# Patient Record
Sex: FEMALE | Race: WHITE | NOT HISPANIC OR LATINO | Employment: FULL TIME | URBAN - METROPOLITAN AREA
[De-identification: names, ages, dates, MRNs, and addresses within clinical notes are randomized per-mention and may not be internally consistent; named-entity substitution may affect disease eponyms.]

---

## 2018-09-11 ENCOUNTER — GYNECOLOGY VISIT (OUTPATIENT)
Dept: OBGYN | Facility: MEDICAL CENTER | Age: 33
End: 2018-09-11
Payer: COMMERCIAL

## 2018-09-11 ENCOUNTER — HOSPITAL ENCOUNTER (OUTPATIENT)
Facility: MEDICAL CENTER | Age: 33
End: 2018-09-11
Attending: OBSTETRICS & GYNECOLOGY
Payer: COMMERCIAL

## 2018-09-11 VITALS
WEIGHT: 119 LBS | DIASTOLIC BLOOD PRESSURE: 70 MMHG | BODY MASS INDEX: 20.32 KG/M2 | SYSTOLIC BLOOD PRESSURE: 110 MMHG | HEIGHT: 64 IN

## 2018-09-11 DIAGNOSIS — N93.8 DUB (DYSFUNCTIONAL UTERINE BLEEDING): ICD-10-CM

## 2018-09-11 PROCEDURE — 99203 OFFICE O/P NEW LOW 30 MIN: CPT | Performed by: OBSTETRICS & GYNECOLOGY

## 2018-09-11 PROCEDURE — 87491 CHLMYD TRACH DNA AMP PROBE: CPT

## 2018-09-11 PROCEDURE — 76817 TRANSVAGINAL US OBSTETRIC: CPT | Performed by: OBSTETRICS & GYNECOLOGY

## 2018-09-11 PROCEDURE — 87591 N.GONORRHOEAE DNA AMP PROB: CPT

## 2018-09-11 NOTE — PROGRESS NOTES
"Monalisa Mcknight,  32 y.o.  female with Patient's last menstrual period was 2018. presents today with complaint of dysfunctional uterine bleeding.    Subjective : Patient presents to the office for absent menses.    Nausea/Vomiting: No:    Abdominal /pelvic cramping : No :  Vaginal bleeding:No  Positive home UPT       Pertinent positives documented in HPI and all other systems reviewed & are negative    OB History    Para Term  AB Living   3       2     SAB TAB Ectopic Molar Multiple Live Births     2              # Outcome Date GA Lbr Pratik/2nd Weight Sex Delivery Anes PTL Lv   3             2 TAB            1 TAB                   Past Gyn history:  Current Birth control:  none    History reviewed. No pertinent past medical history.  History reviewed. No pertinent surgical history.  Meds: PNV  Allergies: Patient has no known allergies.    Physical Exam:    /70   Ht 1.626 m (5' 4\")   Wt 54 kg (119 lb)   LMP 2018   BMI 20.43 kg/m²   Gen: well-appearing, well-hydrated, well-nourished, thin  HEENT: normal;   PERRLA, EOMI, fundi grossly normal, no papilledema, no AV nicking, sclera clear  Lungs: Clear to auscultation  Heart: RRR No M  Abd: abdomen is soft without significant tenderness, masses, organomegaly or guarding  Pelvic: External genitalia normal, Vagina normal without discharge, Urethra without abnormality or discharge, cervix normal in appearance, no CMT, no bladder tenderness, no adnexal masses or tendernessLab.  Ext: NT bilaterally, no cyanosis, clubbing or edema    No results found for this or any previous visit (from the past 336 hour(s)).    Ultrasound:     TVUS performed and per my read:    Indication: Dating    Findings: cortez intrauterine pregnancy @ 5w5d by CRL.   Positive gestational sac.  Positive yolk sac.   Positive fetal cardiac activity.   No masses seen   No free fluid in the cul-de-sac.    Impression: viable IUP @ 5w5d. EDC by US of " 19      Assessment:  32 y.o.  at 5w5d  amenorrhea  Pregnancy exam/test positive    Plan:  4 weeks  Pap smear UTD, repeat after delivery  Normal pregnancy symptoms discussed  SAB/labor precautions educated  Order prenatal labs and any additional imaging needed at new OB visit  Drink at least 2 liters of water daily  Exercise 30 minutes daily  Call MD rico/ questions or concerns  GC/CT today

## 2018-09-12 DIAGNOSIS — N93.8 DUB (DYSFUNCTIONAL UTERINE BLEEDING): ICD-10-CM

## 2018-09-12 LAB
C TRACH DNA SPEC QL NAA+PROBE: NEGATIVE
N GONORRHOEA DNA SPEC QL NAA+PROBE: NEGATIVE
SPECIMEN SOURCE: NORMAL

## 2018-09-20 ENCOUNTER — TELEPHONE (OUTPATIENT)
Dept: OBGYN | Facility: MEDICAL CENTER | Age: 33
End: 2018-09-20

## 2018-09-20 NOTE — TELEPHONE ENCOUNTER
Called patient, no answer. Left voice asking patient to call us back to let her know her results to GC/CT.     -Negative results.

## 2018-10-17 ENCOUNTER — INITIAL PRENATAL (OUTPATIENT)
Dept: OBGYN | Facility: MEDICAL CENTER | Age: 33
End: 2018-10-17
Payer: COMMERCIAL

## 2018-10-17 VITALS — WEIGHT: 121 LBS | BODY MASS INDEX: 20.77 KG/M2 | DIASTOLIC BLOOD PRESSURE: 62 MMHG | SYSTOLIC BLOOD PRESSURE: 110 MMHG

## 2018-10-17 DIAGNOSIS — Z3A.10 10 WEEKS GESTATION OF PREGNANCY: ICD-10-CM

## 2018-10-17 PROCEDURE — 59401 PR NEW OB VISIT: CPT | Performed by: OBSTETRICS & GYNECOLOGY

## 2018-10-17 NOTE — PROGRESS NOTES
Cc: New OB visit    HPI:  The patient is a 32 y.o.  10w6d based upon early US  Patient's last menstrual period was 2018.    She presents for her new obstetric visit.    She denies fetal movement, denies vaginal bleeding, denies leakage of fluid, denies contractions.     She denies nausea/vomiting, headaches, or urinary symptoms.      OB History    Para Term  AB Living   4       2     SAB TAB Ectopic Molar Multiple Live Births     2              # Outcome Date GA Lbr Pratik/2nd Weight Sex Delivery Anes PTL Lv   4 Current            3             2 TAB            1 TAB                 No past medical history on file.  No past surgical history on file.  Social History     Social History   • Marital status: Single     Spouse name: N/A   • Number of children: N/A   • Years of education: N/A     Occupational History   • Not on file.     Social History Main Topics   • Smoking status: Never Smoker   • Smokeless tobacco: Never Used   • Alcohol use No   • Drug use: No   • Sexual activity: Yes     Partners: Male     Other Topics Concern   • Not on file     Social History Narrative   • No narrative on file     No family history on file.  Allergies:   Allergies as of 10/17/2018   • (No Known Allergies)       PE:    Blood pressure 110/62, weight 54.9 kg (121 lb), last menstrual period 2018.    General: no apparent distress, is well developed and well nourished  Head: normocephalic, atraumatic  Neck: neck is supple, non-tender, no thyromegaly, full range of motion  CVS: regular rate and rhythm, no peripheral edema  Lungs: Normal respiratory effort. Clear to auscultation bilaterally  Abdomen: Bowel sounds positive, nondistended, soft, nontender x4, no rebound or guarding.  Female GYN: normal female external genitalia without lesionsnormal external genitalia, no erythema, no discharge  Skin: No rashes, or ulcers or lesions seen  Psychiatric: Patient shows appropriate affect, is alert and oriented  x3, intact judgment and insight.        A/P:  32 y.o.  10w6d based upon  Patient's last menstrual period was 2018..  She is here for her new obstetric visit.    1. 10 weeks gestation of pregnancy  PRENATAL PANEL 3+HIV+UACXI    URINE CULTURE(NEW)    CYSTIC FIBROSIS PROFILE    SPINAL MUSCULAR ATROPHY COPY NUMBER ANALYSIS    CANCELED: SPINAL MUSCULAR ATROPHY COPY NUMBER ANALYSIS    CANCELED: SPINAL MUSCULAR ATROPHY COPY NUMBER ANALYSIS    CANCELED: SPINAL MUSCULAR ATROPHY DELETION       1. Ultrasound for dates and anatomy at 20 weeks.  2. Second trimester screening for Down Syndrome and neural tube defects offered.  Patient will obtain between 15-20  3.  ordered prenatal labs.  4.  NOB packet given with ACOG prenatal book.  5.  Increase water intake and encouraged healthy nutrition.  6.  Referral to MFM not needed at this time.  7.  Continue prenatal vitamins.  8.  Follow-up in 4-5 weeks.   9.  SAB precautions educated.  10.  SMA and CF screening done

## 2018-10-23 ENCOUNTER — HOSPITAL ENCOUNTER (OUTPATIENT)
Dept: LAB | Facility: MEDICAL CENTER | Age: 33
End: 2018-10-23
Attending: OBSTETRICS & GYNECOLOGY
Payer: COMMERCIAL

## 2018-10-23 DIAGNOSIS — Z3A.10 10 WEEKS GESTATION OF PREGNANCY: ICD-10-CM

## 2018-10-23 LAB
ABO GROUP BLD: NORMAL
APPEARANCE UR: ABNORMAL
BACTERIA #/AREA URNS HPF: ABNORMAL /HPF
BASOPHILS # BLD AUTO: 0.5 % (ref 0–1.8)
BASOPHILS # BLD: 0.04 K/UL (ref 0–0.12)
BILIRUB UR QL STRIP.AUTO: NEGATIVE
BLD GP AB SCN SERPL QL: NORMAL
COLOR UR: YELLOW
EOSINOPHIL # BLD AUTO: 0.03 K/UL (ref 0–0.51)
EOSINOPHIL NFR BLD: 0.3 % (ref 0–6.9)
EPI CELLS #/AREA URNS HPF: ABNORMAL /HPF
ERYTHROCYTE [DISTWIDTH] IN BLOOD BY AUTOMATED COUNT: 46.4 FL (ref 35.9–50)
GLUCOSE UR STRIP.AUTO-MCNC: NEGATIVE MG/DL
HBV SURFACE AG SER QL: NEGATIVE
HCT VFR BLD AUTO: 40.8 % (ref 37–47)
HGB BLD-MCNC: 13.2 G/DL (ref 12–16)
HIV 1+2 AB+HIV1 P24 AG SERPL QL IA: NON REACTIVE
HYALINE CASTS #/AREA URNS LPF: ABNORMAL /LPF
IMM GRANULOCYTES # BLD AUTO: 0.03 K/UL (ref 0–0.11)
IMM GRANULOCYTES NFR BLD AUTO: 0.3 % (ref 0–0.9)
KETONES UR STRIP.AUTO-MCNC: ABNORMAL MG/DL
LEUKOCYTE ESTERASE UR QL STRIP.AUTO: ABNORMAL
LYMPHOCYTES # BLD AUTO: 1.66 K/UL (ref 1–4.8)
LYMPHOCYTES NFR BLD: 18.9 % (ref 22–41)
MCH RBC QN AUTO: 30.6 PG (ref 27–33)
MCHC RBC AUTO-ENTMCNC: 32.4 G/DL (ref 33.6–35)
MCV RBC AUTO: 94.4 FL (ref 81.4–97.8)
MICRO URNS: ABNORMAL
MONOCYTES # BLD AUTO: 0.53 K/UL (ref 0–0.85)
MONOCYTES NFR BLD AUTO: 6 % (ref 0–13.4)
NEUTROPHILS # BLD AUTO: 6.49 K/UL (ref 2–7.15)
NEUTROPHILS NFR BLD: 74 % (ref 44–72)
NITRITE UR QL STRIP.AUTO: NEGATIVE
NRBC # BLD AUTO: 0 K/UL
NRBC BLD-RTO: 0 /100 WBC
PH UR STRIP.AUTO: 6 [PH]
PLATELET # BLD AUTO: 234 K/UL (ref 164–446)
PMV BLD AUTO: 11.1 FL (ref 9–12.9)
PROT UR QL STRIP: NEGATIVE MG/DL
RBC # BLD AUTO: 4.32 M/UL (ref 4.2–5.4)
RBC # URNS HPF: ABNORMAL /HPF
RBC UR QL AUTO: NEGATIVE
RH BLD: NORMAL
RUBV AB SER QL: 45.7 IU/ML
SP GR UR STRIP.AUTO: 1.02
TREPONEMA PALLIDUM IGG+IGM AB [PRESENCE] IN SERUM OR PLASMA BY IMMUNOASSAY: NON REACTIVE
UROBILINOGEN UR STRIP.AUTO-MCNC: 1 MG/DL
WBC # BLD AUTO: 8.8 K/UL (ref 4.8–10.8)
WBC #/AREA URNS HPF: ABNORMAL /HPF

## 2018-10-23 PROCEDURE — 87340 HEPATITIS B SURFACE AG IA: CPT

## 2018-10-23 PROCEDURE — 86900 BLOOD TYPING SEROLOGIC ABO: CPT

## 2018-10-23 PROCEDURE — 81401 MOPATH PROCEDURE LEVEL 2: CPT

## 2018-10-23 PROCEDURE — 86780 TREPONEMA PALLIDUM: CPT

## 2018-10-23 PROCEDURE — 36415 COLL VENOUS BLD VENIPUNCTURE: CPT

## 2018-10-23 PROCEDURE — 81001 URINALYSIS AUTO W/SCOPE: CPT

## 2018-10-23 PROCEDURE — 86850 RBC ANTIBODY SCREEN: CPT

## 2018-10-23 PROCEDURE — 81223 CFTR GENE FULL SEQUENCE: CPT

## 2018-10-23 PROCEDURE — 87389 HIV-1 AG W/HIV-1&-2 AB AG IA: CPT

## 2018-10-23 PROCEDURE — 85025 COMPLETE CBC W/AUTO DIFF WBC: CPT

## 2018-10-23 PROCEDURE — 81220 CFTR GENE COM VARIANTS: CPT

## 2018-10-23 PROCEDURE — 86762 RUBELLA ANTIBODY: CPT

## 2018-10-23 PROCEDURE — 86901 BLOOD TYPING SEROLOGIC RH(D): CPT

## 2018-10-23 PROCEDURE — 87086 URINE CULTURE/COLONY COUNT: CPT

## 2018-10-25 ENCOUNTER — TELEPHONE (OUTPATIENT)
Dept: OBGYN | Facility: MEDICAL CENTER | Age: 33
End: 2018-10-25

## 2018-10-25 DIAGNOSIS — Z34.01 ENCOUNTER FOR SUPERVISION OF NORMAL FIRST PREGNANCY IN FIRST TRIMESTER: Primary | ICD-10-CM

## 2018-10-25 LAB
BACTERIA UR CULT: NORMAL
SIGNIFICANT IND 70042: NORMAL
SITE SITE: NORMAL
SOURCE SOURCE: NORMAL

## 2018-10-25 NOTE — TELEPHONE ENCOUNTER
Betito from [facility] lab called, states CF order was wrong, new correct order is [id number].  New order was sent.

## 2018-10-30 LAB
CF EXPANDED VARIANT PANEL INTERP Q4864: NORMAL
CFTR ALLELE 1 BLD/T QL: NEGATIVE
CFTR ALLELE 1 BLD/T QL: NEGATIVE
CFTR MUT ANL BLD/T: NORMAL
GEN STRUCT VAR COPY NUM: NORMAL
SMN1 GENE MUT ANL BLD/T: NORMAL
SMN1+SMN2 GENE MUT ANL BLD/T: NORMAL
SMN2 GENE MUT ANL BLD/T: NORMAL
SPECIMEN SOURCE: NORMAL
SYMPTOM: NORMAL

## 2018-11-27 ENCOUNTER — ROUTINE PRENATAL (OUTPATIENT)
Dept: OBGYN | Facility: MEDICAL CENTER | Age: 33
End: 2018-11-27
Payer: COMMERCIAL

## 2018-11-27 VITALS
DIASTOLIC BLOOD PRESSURE: 62 MMHG | WEIGHT: 126.32 LBS | BODY MASS INDEX: 21.68 KG/M2 | SYSTOLIC BLOOD PRESSURE: 110 MMHG

## 2018-11-27 DIAGNOSIS — Z34.80 SUPERVISION OF OTHER NORMAL PREGNANCY, ANTEPARTUM: ICD-10-CM

## 2018-11-27 PROCEDURE — 90040 PR PRENATAL FOLLOW UP: CPT | Performed by: OBSTETRICS & GYNECOLOGY

## 2018-11-27 NOTE — PROGRESS NOTES
LUIS ARMANDO:  16w5d    Pt reports doing well.  Denies vaginal bleeding, abd pain, LOF.  No regular FM yet.    /62   Wt 57.3 kg (126 lb 5.2 oz)   LMP 2018   BMI 21.68 kg/m²   gen: AAO, NAD  FHTs: 150        A/P: 32 y.o.  @ 16w5d by 5wk US  - PNL up to date, Rh+/-; anatomy US rx given today  - QS rx given today   - flu shot: discussed risks of flu in pregnancy including respiratory failure.  Also discussed benefits of flu protection for baby who cannot be vaccinated until 6 mos of age.  Pt will consider and let us know at next visit.  - discussed total anticipated wt gain in pregnancy (25-35lbs) and recommendation for regular cardiac exercise in pregnancy (avoid activities with high risk of falling like skiing)     RTC 4wks    Zee Oates MD  [facility], Women's Health

## 2018-11-27 NOTE — PROGRESS NOTES
Pt here today for OB follow up  Pt denies leaking fluids or vaginal blood  Good # [phone]  Pharmacy Confirmed.

## 2018-12-15 LAB
2ND TRIMESTER 4 SCREEN SERPL-IMP: NORMAL
2ND TRIMESTER 4 SCREEN SERPL-IMP: NORMAL
AFP ADJ MOM SERPL: 1.13
AFP SERPL-MCNC: 60.8 NG/ML
AGE AT DELIVERY: 32.3 YR
COMMENTS  018014: NORMAL
DOT  017389: NORMAL
FET TS 18 RISK FROM MAT AGE: NORMAL
FET TS 21 RISK FROM MAT AGE: 502
GA METHOD: NORMAL
GA: 18.9 WEEKS
HCG ADJ MOM SERPL: 0.65
HCG SERPL-ACNC: NORMAL MIU/ML
IDDM PATIENT QL: NO
INHIBIN A ADJ MOM SERPL: 1.9
INHIBIN A SERPL-MCNC: 377.08 PG/ML
MULTIPLE PREGNANCY: NO
NEURAL TUBE DEFECT RISK FETUS: 8106 %
TS 18 RISK FETUS: NORMAL
TS 21 RISK FETUS: 1999
U ESTRIOL ADJ MOM SERPL: 1.06
U ESTRIOL SERPL-MCNC: 1.74 NG/ML

## 2018-12-20 ENCOUNTER — HOSPITAL ENCOUNTER (OUTPATIENT)
Dept: RADIOLOGY | Facility: MEDICAL CENTER | Age: 33
End: 2018-12-20
Attending: OBSTETRICS & GYNECOLOGY
Payer: COMMERCIAL

## 2018-12-20 DIAGNOSIS — Z34.80 SUPERVISION OF OTHER NORMAL PREGNANCY, ANTEPARTUM: ICD-10-CM

## 2018-12-20 PROCEDURE — 76805 OB US >/= 14 WKS SNGL FETUS: CPT

## 2018-12-28 ENCOUNTER — ROUTINE PRENATAL (OUTPATIENT)
Dept: OBGYN | Facility: MEDICAL CENTER | Age: 33
End: 2018-12-28
Payer: COMMERCIAL

## 2018-12-28 VITALS
WEIGHT: 132.28 LBS | DIASTOLIC BLOOD PRESSURE: 60 MMHG | SYSTOLIC BLOOD PRESSURE: 100 MMHG | BODY MASS INDEX: 22.71 KG/M2

## 2018-12-28 DIAGNOSIS — Z34.80 SUPERVISION OF OTHER NORMAL PREGNANCY, ANTEPARTUM: ICD-10-CM

## 2018-12-28 PROCEDURE — 90040 PR PRENATAL FOLLOW UP: CPT | Performed by: OBSTETRICS & GYNECOLOGY

## 2018-12-28 NOTE — PROGRESS NOTES
Pt here today for OB follow up  Pt denies VB and LOF   Reports +FM  Good #[phone]  Pharmacy Confirmed.

## 2018-12-28 NOTE — PROGRESS NOTES
S: Pt presents for routine OB follow up.  No contractions, vaginal bleeding, or leaking fluids. Good fetal movement.    Questions answered.    O: /60   Wt 60 kg (132 lb 4.4 oz)   LMP 2018   BMI 22.71 kg/m²   Patients' weight gain, fluid intake and exercise level discussed.  Vitals, fundal height , fetal position, and FHR reviewed on flowsheet    Fundus: 23cm    Lab:No results found for this or any previous visit (from the past 336 hour(s)).    Prenatal labs:  T&S: O+  Hep B: neg  T.Pall: neg  Rubella: immune  HIV: neg  SMA: 2 copies  CF: low risk  First TM/Quad: low risk    Level II: S=D Normal anatomy.    A/P:  33 y.o.  at 21w1d based on LMP and confirmed by 5 wk US. Patient's last menstrual period was 2018. presents for routine obstetric follow-up. Size equals dates and/or scan  Declines flu shot  Continue prenatal vitamins.  Begin kick counts at 28 weeks.  Exercise at least 30 minutes daily.  Increase water intake.  Follow-up in 4 weeks.  Questions regarding weight gain answered

## 2019-01-29 ENCOUNTER — ROUTINE PRENATAL (OUTPATIENT)
Dept: OBGYN | Facility: MEDICAL CENTER | Age: 34
End: 2019-01-29
Payer: COMMERCIAL

## 2019-01-29 VITALS — DIASTOLIC BLOOD PRESSURE: 60 MMHG | SYSTOLIC BLOOD PRESSURE: 102 MMHG | BODY MASS INDEX: 24.03 KG/M2 | WEIGHT: 140 LBS

## 2019-01-29 DIAGNOSIS — Z34.80 SUPERVISION OF OTHER NORMAL PREGNANCY, ANTEPARTUM: ICD-10-CM

## 2019-01-29 PROCEDURE — 90040 PR PRENATAL FOLLOW UP: CPT | Performed by: OBSTETRICS & GYNECOLOGY

## 2019-01-29 NOTE — PROGRESS NOTES
Pt here today for OB follow up  Pt states denies VB and LOF Pt states that she has a increase of discharge  Reports +FM  Good # [phone]  Pharmacy Confirmed.

## 2019-01-29 NOTE — PROGRESS NOTES
S: Pt presents for routine OB follow up.  No contractions, vaginal bleeding, or leaking fluids. Good fetal movement.  Pt asking if salami is ok to eat in pregnancy. She also c/o increased white creamy discharge which is not associated with any irritation.     Questions answered.    O: LMP 2018   Patients' weight gain, fluid intake and exercise level discussed.  Vitals, fundal height , fetal position, and FHR reviewed on flowsheet    FH: 141bpm  Fundus: 28cm    Lab:No results found for this or any previous visit (from the past 336 hour(s)).    Prenatal labs:  T&S: O+  Hep B: neg  T.Pall: neg  Rubella: immune  HIV: neg  SMA: 2 copies  CF: low risk  First TM/Quad: low risk  1 hour: today    Level II: S=D Normal anatomy.    A/P:  33 y.o.  at 25w5d based on LMP c/w 5wk US presents for routine obstetric follow-up.   Patient's last menstrual period was 2018.   Size equals dates and/or scan  - Delivery plan: vaginal  - Postpartum prophylaxis: will think about LARC vs DODEI vs condoms  - Advised to heat any deli meats to steaming in the microwave.   - Advised to wear liner and if she starts to have irritation to let us know.   - Continue prenatal vitamins.   - Begin kick counts at 28 weeks.  - Exercise at least 30 minutes daily.  - Increase water intake.  - Follow-up in 4 weeks.

## 2019-02-21 ENCOUNTER — HOSPITAL ENCOUNTER (OUTPATIENT)
Dept: LAB | Facility: MEDICAL CENTER | Age: 34
End: 2019-02-21
Attending: OBSTETRICS & GYNECOLOGY
Payer: COMMERCIAL

## 2019-02-21 DIAGNOSIS — Z34.80 SUPERVISION OF OTHER NORMAL PREGNANCY, ANTEPARTUM: ICD-10-CM

## 2019-02-21 DIAGNOSIS — Z34.00 SUPERVISION OF NORMAL FIRST PREGNANCY, ANTEPARTUM: ICD-10-CM

## 2019-02-21 LAB
ERYTHROCYTE [DISTWIDTH] IN BLOOD BY AUTOMATED COUNT: 49.8 FL (ref 35.9–50)
GLUCOSE 1H P 50 G GLC PO SERPL-MCNC: 164 MG/DL (ref 70–139)
HCT VFR BLD AUTO: 37.2 % (ref 37–47)
HGB BLD-MCNC: 11.7 G/DL (ref 12–16)
MCH RBC QN AUTO: 30.8 PG (ref 27–33)
MCHC RBC AUTO-ENTMCNC: 31.5 G/DL (ref 33.6–35)
MCV RBC AUTO: 97.9 FL (ref 81.4–97.8)
PLATELET # BLD AUTO: 255 K/UL (ref 164–446)
PMV BLD AUTO: 10.5 FL (ref 9–12.9)
RBC # BLD AUTO: 3.8 M/UL (ref 4.2–5.4)
TREPONEMA PALLIDUM IGG+IGM AB [PRESENCE] IN SERUM OR PLASMA BY IMMUNOASSAY: NON REACTIVE
WBC # BLD AUTO: 15.7 K/UL (ref 4.8–10.8)

## 2019-02-21 PROCEDURE — 36415 COLL VENOUS BLD VENIPUNCTURE: CPT

## 2019-02-21 PROCEDURE — 85027 COMPLETE CBC AUTOMATED: CPT

## 2019-02-21 PROCEDURE — 86780 TREPONEMA PALLIDUM: CPT

## 2019-02-21 PROCEDURE — 82950 GLUCOSE TEST: CPT

## 2019-02-22 ENCOUNTER — TELEPHONE (OUTPATIENT)
Dept: OBGYN | Facility: CLINIC | Age: 34
End: 2019-02-22

## 2019-02-22 NOTE — TELEPHONE ENCOUNTER
Pt notified of elevated 1 hr gtt and need for a 3 hr gtt, pt was advised to fast at least 10 hrs may have little water, bring snack for after testing and she will be at the lab for 3 hrs. Pt had no further questions    ----- Message from John Beach D.O. sent at 2/21/2019  3:07 PM PST -----  Please call patient. She filled a 1 hour notate needs to take the 3-hour test.

## 2019-02-26 ENCOUNTER — ROUTINE PRENATAL (OUTPATIENT)
Dept: OBGYN | Facility: MEDICAL CENTER | Age: 34
End: 2019-02-26
Payer: COMMERCIAL

## 2019-02-26 VITALS — BODY MASS INDEX: 24.37 KG/M2 | SYSTOLIC BLOOD PRESSURE: 100 MMHG | DIASTOLIC BLOOD PRESSURE: 64 MMHG | WEIGHT: 142 LBS

## 2019-02-26 DIAGNOSIS — Z34.80 SUPERVISION OF OTHER NORMAL PREGNANCY, ANTEPARTUM: ICD-10-CM

## 2019-02-26 PROCEDURE — 90040 PR PRENATAL FOLLOW UP: CPT | Performed by: OBSTETRICS & GYNECOLOGY

## 2019-02-26 NOTE — LETTER
"Count Your Baby's Movements  Another step to a healthy delivery        How Many Weeks Pregnant? 29 weeks 5 days     Date to Begin Countin19              How to use this chart    One way for your physician to keep track of your baby's health is by knowing how often the baby moves (or \"kicks\") in your womb.  You can help your physician to do this by using this chart every day.    Every day, you should see how many hours it takes for your baby to move 10 times.  Start in the morning, as soon as you get up.    · First, write down the time your baby moves until you get to 10.  · Check off one box every time your baby moves until you get to 10.  · Write down the time you finished counting in the last column.  · Total how long it took to count up all 10 movements.  · Finally, fill in the box that shows how long this took.  After counting 10 movements, you no longer have to count any more that day.  The next morning, just start counting again as soon as you get up.    What should you call a \"movement\"?  It is hard to say, because it will feel different from one mother to another and from one pregnancy to the next.  The important thing is that you count the movements the same way throughout your pregnancy.  If you have more questions, you should ask your physician.    Count carefully every day!  SAMPLE:  Week 28    How many hours did it take to feel 10 movements?       Start  Time     1     2     3     4     5     6     7     8     9     10   Finish Time   Mon 8:20 ·  ·  ·  ·  ·  ·  ·  ·  ·  ·  11:40                  Sat               Sun                 IMPORTANT: You should contact your physician if it takes more than two hours for you to feel 10 movements.  Each morning, write down the time and start to count the movements of your baby.  Keep track by checking off one box every time you feel one movement.  When you have felt 10 \"kicks\", write down the time " you finished counting in the last column.  Then fill in the   box (over the check mai) for the number of hours it took.  Be sure to read the complete instructions on the previous page.

## 2019-02-26 NOTE — PROGRESS NOTES
S: Pt presents for routine OB follow up.  No contractions, vaginal bleeding, or leaking fluids. Good fetal movement.  Pt not sleeping as well because she is afraid to lie on her back as per her preference before pregnancy.   Failed 1 hour but has not taken 3 hr yet    O: Wt 64.4 kg (142 lb)   LMP 2018   BMI 24.37 kg/m²   Patients' weight gain, fluid intake and exercise level discussed.  Vitals, fundal height , fetal position, and FHR reviewed on flowsheet    SVE: deferred  FH: 135bpm  Fundus: 31cm    Lab:  Recent Results (from the past 336 hour(s))   CBC WITHOUT DIFFERENTIAL    Collection Time: 19  9:59 AM   Result Value Ref Range    WBC 15.7 (H) 4.8 - 10.8 K/uL    RBC 3.80 (L) 4.20 - 5.40 M/uL    Hemoglobin 11.7 (L) 12.0 - 16.0 g/dL    Hematocrit 37.2 37.0 - 47.0 %    MCV 97.9 (H) 81.4 - 97.8 fL    MCH 30.8 27.0 - 33.0 pg    MCHC 31.5 (L) 33.6 - 35.0 g/dL    RDW 49.8 35.9 - 50.0 fL    Platelet Count 255 164 - 446 K/uL    MPV 10.5 9.0 - 12.9 fL   GLUCOSE 1HR GESTATIONAL    Collection Time: 19  9:59 AM   Result Value Ref Range    Glucose, Post Dose 164 (H) 70 - 139 mg/dL   T.PALLIDUM AB EIA    Collection Time: 19  9:59 AM   Result Value Ref Range    Syphilis, Treponemal Qual Non Reactive Non Reactive     Prenatal labs:  T&S: O+  Hep B: neg  T.Pall: neg  Rubella: immune  HIV: neg  SMA: 2 copies  CF: low risk  First TM/Quad: low risk  1 hour: 164     Level II: S=D Normal anatomy.    A/P:  33 y.o.  at 29w5d based on LMP c/w 5wk US presents for routine obstetric follow-up.   Patient's last menstrual period was 2018.  Size equals dates and/or scan  - Delivery plan: anticipate vaginal  - Postpartum prophylaxis: thinking about LARC vs condoms  - Advised to take 3hr ASAP.   - Advised may take Unisom prn, may place pillow for slight left lateral tilt, may use knee pillow for side sleeping prn.   - Continue prenatal vitamins.  -  labor precautions and fetal kick counts   - Increase  water intake.  - Follow-up in 2 weeks.

## 2019-02-26 NOTE — PROGRESS NOTES
Ob follow up. Good fetal movement. Pt has no complaints.   Phone # & pharmacy verified.  Pt needs 3 hr GTT. Slip given to patient

## 2019-03-13 ENCOUNTER — ROUTINE PRENATAL (OUTPATIENT)
Dept: OBGYN | Facility: MEDICAL CENTER | Age: 34
End: 2019-03-13
Payer: COMMERCIAL

## 2019-03-13 VITALS — DIASTOLIC BLOOD PRESSURE: 62 MMHG | BODY MASS INDEX: 25.4 KG/M2 | SYSTOLIC BLOOD PRESSURE: 100 MMHG | WEIGHT: 148 LBS

## 2019-03-13 DIAGNOSIS — Z34.80 SUPERVISION OF OTHER NORMAL PREGNANCY, ANTEPARTUM: Primary | ICD-10-CM

## 2019-03-13 PROCEDURE — 90040 PR PRENATAL FOLLOW UP: CPT | Performed by: NURSE PRACTITIONER

## 2019-03-13 PROCEDURE — 90471 IMMUNIZATION ADMIN: CPT | Performed by: NURSE PRACTITIONER

## 2019-03-13 PROCEDURE — 90715 TDAP VACCINE 7 YRS/> IM: CPT | Performed by: NURSE PRACTITIONER

## 2019-03-13 NOTE — PROGRESS NOTES
S) Pt is a 33 y.o.   at 31w6d  gestation. Routine prenatal care today. Pt has not yet done the 3 hr GTT.  Discussed importance of this test and implications of uncontrolled diabetes during pregnancy, ie shoulder dystocia, large babies, need for instrumental delivery.    Fetal movement Normal  Cramping no  VB no  LOF no   Denies dysuria. Generally feels well today. Good self-care activities identified. Denies headaches, swelling, visual changes, or epigastric pain .     O) Blood pressure 100/62, weight 67.1 kg (148 lb), last menstrual period 2018.        Labs: WNL       PNL:  WNL       GCT: elevated 1 hr, needs 3 hr       AFP: quad screen, CF neg       GBS: N/A       Pertinent ultrasound - 18 NANCY 13.4, survey WNL, c/w prev dating           A) IUP at 31w6d       S=D         Patient Active Problem List    Diagnosis Date Noted   • Supervision of other normal pregnancy, antepartum 2018          SVE: deferred         TDAP: yes       FLU: no        BTL: no       : no       C/S Consent: no       IOL or C/S scheduled: no       LAST PAP: needs PP, none on record         P) s/s ptl vs general discomforts.   Fetal movements reviewed. General ed and anticipatory guidance. Nutrition/exercise/vitamin. Plans breast Plans pp contraception- unsure    Encouraged to do the 3 hr GTT ASAP  TDAP today  RTC 2 weeks or PRN.   Encouraged to do PNC or tour.    Sabrina Warner CNM

## 2019-03-13 NOTE — PROGRESS NOTES
Pt here today for OB follow up  Pt states denies VB and LOF  Reports +FM  Pharmacy &#Confirmed.

## 2019-03-16 ENCOUNTER — HOSPITAL ENCOUNTER (OUTPATIENT)
Dept: LAB | Facility: MEDICAL CENTER | Age: 34
End: 2019-03-16
Attending: OBSTETRICS & GYNECOLOGY
Payer: COMMERCIAL

## 2019-03-16 DIAGNOSIS — Z34.00 SUPERVISION OF NORMAL FIRST PREGNANCY, ANTEPARTUM: ICD-10-CM

## 2019-03-16 LAB
GLUCOSE 1H P CHAL SERPL-MCNC: 177 MG/DL (ref 65–199)
GLUCOSE 2H P CHAL SERPL-MCNC: 96 MG/DL (ref 65–139)
GLUCOSE 3H P CHAL SERPL-MCNC: 94 MG/DL (ref 65–109)
GLUCOSE BS SERPL-MCNC: 73 MG/DL (ref 65–99)

## 2019-03-16 PROCEDURE — 36415 COLL VENOUS BLD VENIPUNCTURE: CPT

## 2019-03-16 PROCEDURE — 82951 GLUCOSE TOLERANCE TEST (GTT): CPT

## 2019-03-16 PROCEDURE — 82952 GTT-ADDED SAMPLES: CPT

## 2019-03-28 ENCOUNTER — ROUTINE PRENATAL (OUTPATIENT)
Dept: OBGYN | Facility: MEDICAL CENTER | Age: 34
End: 2019-03-28
Payer: COMMERCIAL

## 2019-03-28 VITALS — WEIGHT: 150 LBS | SYSTOLIC BLOOD PRESSURE: 103 MMHG | DIASTOLIC BLOOD PRESSURE: 60 MMHG | BODY MASS INDEX: 25.75 KG/M2

## 2019-03-28 DIAGNOSIS — Z34.83 ENCOUNTER FOR SUPERVISION OF OTHER NORMAL PREGNANCY IN THIRD TRIMESTER: ICD-10-CM

## 2019-03-28 PROCEDURE — 90040 PR PRENATAL FOLLOW UP: CPT | Performed by: OBSTETRICS & GYNECOLOGY

## 2019-03-28 NOTE — PROGRESS NOTES
Ob visit @ 34w .Doing well with active fm.Pt denies bleeding,leaking fluid or other c/o. 3hr gtt normal.

## 2019-03-28 NOTE — PROGRESS NOTES
LUIS ARMANDO:  34w0d    Pt reports doing well.  Denies vaginal bleeding, contractions, LOF.  Reports +FM.    /60   Wt 68 kg (150 lb)   LMP 2018   BMI 25.75 kg/m²   gen: AAO, NAD  FHTs: 140  FH: 33    A/P: 33 y.o.  @ 34w0d by 5wk US  - PNL up to date, Rh+/-, elevated 1hr --> normal 3hr  - anatomy US wnl  - s/p Tdap, declined flu  - plans to BF; considering pills for contraception - encouraged to consider LARC      Reviewed labor precautions (to call or come to hospital for ctx q5min, VB, LOF, decreased FM)    RTC 2wks, GBS that visit    Zee Oates MD  [facility], Women's Health

## 2019-04-12 ENCOUNTER — ROUTINE PRENATAL (OUTPATIENT)
Dept: OBGYN | Facility: MEDICAL CENTER | Age: 34
End: 2019-04-12
Payer: COMMERCIAL

## 2019-04-12 ENCOUNTER — HOSPITAL ENCOUNTER (OUTPATIENT)
Facility: MEDICAL CENTER | Age: 34
End: 2019-04-12
Attending: OBSTETRICS & GYNECOLOGY
Payer: COMMERCIAL

## 2019-04-12 VITALS — WEIGHT: 152 LBS | DIASTOLIC BLOOD PRESSURE: 60 MMHG | BODY MASS INDEX: 26.09 KG/M2 | SYSTOLIC BLOOD PRESSURE: 102 MMHG

## 2019-04-12 DIAGNOSIS — Z34.83 ENCOUNTER FOR SUPERVISION OF OTHER NORMAL PREGNANCY IN THIRD TRIMESTER: ICD-10-CM

## 2019-04-12 PROCEDURE — 87150 DNA/RNA AMPLIFIED PROBE: CPT

## 2019-04-12 PROCEDURE — 90040 PR PRENATAL FOLLOW UP: CPT | Performed by: OBSTETRICS & GYNECOLOGY

## 2019-04-12 PROCEDURE — 87081 CULTURE SCREEN ONLY: CPT

## 2019-04-12 NOTE — PROGRESS NOTES
Pt here today for OB follow up  Pt states denies VB and LOF  Reports +FM  Pharmacy Confirmed.  Chaperone offered and accepted.   GBS Today.

## 2019-04-12 NOTE — PROGRESS NOTES
LUIS ARMANDO:  36w1d    Pt reports doing well.  Denies vaginal bleeding, contractions, LOF.  Reports +FM.    /60   Wt 68.9 kg (152 lb)   LMP 2018   BMI 26.09 kg/m²   gen: AAO, NAD  FHTs: 140  FH: 35    SVE; cl/th/h  Pelvic exam was chaperoned by MA (AB).      A/P: 33 y.o.  @ 36w1d by 5wk US  - PNL up to date, Rh+/-, elevated 1hr --> normal 3hr  - anatomy US wnl  - s/p Tdap, declined flu  - plans to BF; considering pills for contraception - encouraged to consider LARC  - GBS today      Reviewed labor precautions (to call or come to hospital for ctx q5min, VB, LOF, decreased FM)    RTC 1wks    Zee Oates MD  [facility], Women's Health

## 2019-04-13 LAB — GP B STREP DNA SPEC QL NAA+PROBE: NEGATIVE

## 2019-04-16 ENCOUNTER — APPOINTMENT (OUTPATIENT)
Dept: OTHER | Facility: IMAGING CENTER | Age: 34
End: 2019-04-16

## 2019-04-17 ENCOUNTER — APPOINTMENT (OUTPATIENT)
Dept: OTHER | Facility: IMAGING CENTER | Age: 34
End: 2019-04-17

## 2019-04-18 ENCOUNTER — ROUTINE PRENATAL (OUTPATIENT)
Dept: OBGYN | Facility: MEDICAL CENTER | Age: 34
End: 2019-04-18
Payer: COMMERCIAL

## 2019-04-18 VITALS — SYSTOLIC BLOOD PRESSURE: 104 MMHG | BODY MASS INDEX: 26.43 KG/M2 | DIASTOLIC BLOOD PRESSURE: 68 MMHG | WEIGHT: 154 LBS

## 2019-04-18 DIAGNOSIS — Z34.83 ENCOUNTER FOR SUPERVISION OF OTHER NORMAL PREGNANCY IN THIRD TRIMESTER: ICD-10-CM

## 2019-04-18 PROCEDURE — 90040 PR PRENATAL FOLLOW UP: CPT | Performed by: OBSTETRICS & GYNECOLOGY

## 2019-04-18 NOTE — PROGRESS NOTES
LUIS ARMANDO:  37w0d    Pt reports doing well.  Denies vaginal bleeding, contractions, LOF.  Reports +FM.    /68   Wt 69.9 kg (154 lb)   LMP 2018   BMI 26.43 kg/m²   gen: AAO, NAD  FHTs: 150  FH: 36        A/P: 33 y.o.  @ 37w0d by 5wk US  - PNL up to date, Rh+/-, elevated 1hr --> normal 3hr  - anatomy US wnl  - s/p Tdap, declined flu  - GBS neg      Reviewed labor precautions (to call or come to hospital for ctx q5min, VB, LOF, decreased FM)    RTC 1wks    Zee Oates MD  [facility], Women's [facility]

## 2019-04-24 ENCOUNTER — ROUTINE PRENATAL (OUTPATIENT)
Dept: OBGYN | Facility: MEDICAL CENTER | Age: 34
End: 2019-04-24
Payer: COMMERCIAL

## 2019-04-24 VITALS — SYSTOLIC BLOOD PRESSURE: 106 MMHG | DIASTOLIC BLOOD PRESSURE: 70 MMHG | WEIGHT: 154 LBS | BODY MASS INDEX: 26.43 KG/M2

## 2019-04-24 DIAGNOSIS — Z34.83 ENCOUNTER FOR SUPERVISION OF OTHER NORMAL PREGNANCY IN THIRD TRIMESTER: ICD-10-CM

## 2019-04-24 PROCEDURE — 90040 PR PRENATAL FOLLOW UP: CPT | Performed by: OBSTETRICS & GYNECOLOGY

## 2019-04-24 NOTE — PROGRESS NOTES
LUIS ARMANDO:  37w6d    Pt reports doing well.  Denies vaginal bleeding, contractions, LOF.  Reports +FM.  Getting more uncomfortable, some nausea at times, no vomiting.      /70   Wt 69.9 kg (154 lb)   LMP 2018   BMI 26.43 kg/m²   gen: AAO, NAD  FHTs: 140  FH: 36    SVE:     A/P: 33 y.o.  @ 37w6d by 5wk US  - PNL up to date, Rh+/-, elevated 1hr --> normal 3hr  - anatomy US wnl  - s/p Tdap, declined flu  - GBS neg      Reviewed labor precautions (to call or come to hospital for ctx q5min, VB, LOF, decreased FM)    RTC 1wks    Zee Oates MD  [facility], Women's Health

## 2019-04-24 NOTE — PROGRESS NOTES
Pt here today for OB follow up  Pt states denies VB and LOF pt states that she has been having a lot of hot flashes and nausea.  Reports +FM  Pharmacy Confirmed.

## 2019-04-30 ENCOUNTER — ROUTINE PRENATAL (OUTPATIENT)
Dept: OBGYN | Facility: MEDICAL CENTER | Age: 34
End: 2019-04-30
Payer: COMMERCIAL

## 2019-04-30 VITALS — DIASTOLIC BLOOD PRESSURE: 64 MMHG | WEIGHT: 154 LBS | BODY MASS INDEX: 26.43 KG/M2 | SYSTOLIC BLOOD PRESSURE: 100 MMHG

## 2019-04-30 DIAGNOSIS — Z34.83 ENCOUNTER FOR SUPERVISION OF OTHER NORMAL PREGNANCY IN THIRD TRIMESTER: ICD-10-CM

## 2019-04-30 PROCEDURE — 90040 PR PRENATAL FOLLOW UP: CPT | Performed by: OBSTETRICS & GYNECOLOGY

## 2019-04-30 NOTE — PROGRESS NOTES
LUIS ARMANDO:  38w5d    Pt reports doing well.  Denies vaginal bleeding, contractions, LOF.  Reports +FM.  Increased pressure.    /64   Wt 69.9 kg (154 lb)   LMP 2018   BMI 26.43 kg/m²   gen: AAO, NAD  FHTs: 130  FH: 37    A/P: 33 y.o.  @ 38w5d by 5wk US  - PNL up to date, Rh+/-, elevated 1hr --> normal 3hr  - anatomy US wnl  - s/p Tdap, declined flu  - GBS neg      Reviewed labor precautions (to call or come to hospital for ctx q5min, VB, LOF, decreased FM)    RTC 1wks    Zee Oates MD  [facility], Women[facility]

## 2019-05-10 ENCOUNTER — ROUTINE PRENATAL (OUTPATIENT)
Dept: OBGYN | Facility: MEDICAL CENTER | Age: 34
End: 2019-05-10
Payer: COMMERCIAL

## 2019-05-10 VITALS — BODY MASS INDEX: 26.61 KG/M2 | DIASTOLIC BLOOD PRESSURE: 60 MMHG | SYSTOLIC BLOOD PRESSURE: 102 MMHG | WEIGHT: 155 LBS

## 2019-05-10 DIAGNOSIS — Z34.83 ENCOUNTER FOR SUPERVISION OF OTHER NORMAL PREGNANCY, THIRD TRIMESTER: Primary | ICD-10-CM

## 2019-05-10 PROCEDURE — 90040 PR PRENATAL FOLLOW UP: CPT | Performed by: NURSE PRACTITIONER

## 2019-05-10 NOTE — PROGRESS NOTES
Pt here today for OB follow up  Pt states she would like to be checked today  Reports +FM  Pharmacy Confirmed.  Chaperone offered and declined.

## 2019-05-10 NOTE — PROGRESS NOTES
SUBJECTIVE:  Pt is a 33 y.o.   at 40w1d  gestation. Presents today for follow-up prenatal care. Reports no issues at this time.  Reports feeling good  fetal movement. Denies cramping/contractions, bleeding or leaking of fluid. Denies dysuria, headaches, N/V. Generally feels well today. Recent ER or L&D visits  - none.     OBJECTIVE:  - See prenatal vitals flow  -   Vitals:    05/10/19 0852   BP: 102/60   Weight: 70.3 kg (155 lb)      Fundal Height - 39FHT -  US normal  Prenatal labs normal gbs neg.               ASSESSMENT:   - IUP at 40w1d    - S=D   -   Patient Active Problem List    Diagnosis Date Noted   • Supervision of other normal pregnancy, antepartum 2018         PLAN:  - S/sx pregnancy and labor warning signs vs general discomforts discussed  - Fetal movements and kick counts reviewed   - Adequate hydration reinforced  - Nutrition/exercise/vitamin education; continued PNV  Order placed for induction of labor to be scheduled in absence of spontaneous labor.

## 2019-05-14 ENCOUNTER — HOSPITAL ENCOUNTER (INPATIENT)
Facility: MEDICAL CENTER | Age: 34
LOS: 2 days | End: 2019-05-16
Attending: OBSTETRICS & GYNECOLOGY | Admitting: OBSTETRICS & GYNECOLOGY
Payer: COMMERCIAL

## 2019-05-14 ENCOUNTER — ANESTHESIA EVENT (OUTPATIENT)
Dept: OBGYN | Facility: MEDICAL CENTER | Age: 34
End: 2019-05-14
Payer: COMMERCIAL

## 2019-05-14 ENCOUNTER — ANESTHESIA (OUTPATIENT)
Dept: OBGYN | Facility: MEDICAL CENTER | Age: 34
End: 2019-05-14
Payer: COMMERCIAL

## 2019-05-14 ENCOUNTER — APPOINTMENT (OUTPATIENT)
Dept: OBGYN | Facility: MEDICAL CENTER | Age: 34
End: 2019-05-14
Attending: OBSTETRICS & GYNECOLOGY
Payer: COMMERCIAL

## 2019-05-14 LAB
BASOPHILS # BLD AUTO: 0.6 % (ref 0–1.8)
BASOPHILS # BLD: 0.08 K/UL (ref 0–0.12)
EOSINOPHIL # BLD AUTO: 0.07 K/UL (ref 0–0.51)
EOSINOPHIL NFR BLD: 0.6 % (ref 0–6.9)
ERYTHROCYTE [DISTWIDTH] IN BLOOD BY AUTOMATED COUNT: 52.1 FL (ref 35.9–50)
ERYTHROCYTE [DISTWIDTH] IN BLOOD BY AUTOMATED COUNT: 52.5 FL (ref 35.9–50)
HCT VFR BLD AUTO: 30.2 % (ref 37–47)
HCT VFR BLD AUTO: 39.9 % (ref 37–47)
HGB BLD-MCNC: 12.4 G/DL (ref 12–16)
HGB BLD-MCNC: 9.9 G/DL (ref 12–16)
HOLDING TUBE BB 8507: NORMAL
IMM GRANULOCYTES # BLD AUTO: 0.18 K/UL (ref 0–0.11)
IMM GRANULOCYTES NFR BLD AUTO: 1.5 % (ref 0–0.9)
LYMPHOCYTES # BLD AUTO: 2.04 K/UL (ref 1–4.8)
LYMPHOCYTES NFR BLD: 16.5 % (ref 22–41)
MCH RBC QN AUTO: 29.8 PG (ref 27–33)
MCH RBC QN AUTO: 31.1 PG (ref 27–33)
MCHC RBC AUTO-ENTMCNC: 31.1 G/DL (ref 33.6–35)
MCHC RBC AUTO-ENTMCNC: 32.6 G/DL (ref 33.6–35)
MCV RBC AUTO: 95.5 FL (ref 81.4–97.8)
MCV RBC AUTO: 95.9 FL (ref 81.4–97.8)
MONOCYTES # BLD AUTO: 1.1 K/UL (ref 0–0.85)
MONOCYTES NFR BLD AUTO: 8.9 % (ref 0–13.4)
NEUTROPHILS # BLD AUTO: 8.9 K/UL (ref 2–7.15)
NEUTROPHILS NFR BLD: 71.9 % (ref 44–72)
NRBC # BLD AUTO: 0 K/UL
NRBC BLD-RTO: 0 /100 WBC
PLATELET # BLD AUTO: 175 K/UL (ref 164–446)
PLATELET # BLD AUTO: 235 K/UL (ref 164–446)
PMV BLD AUTO: 11.2 FL (ref 9–12.9)
PMV BLD AUTO: 11.6 FL (ref 9–12.9)
RBC # BLD AUTO: 3.12 M/UL (ref 4.2–5.4)
RBC # BLD AUTO: 4.16 M/UL (ref 4.2–5.4)
WBC # BLD AUTO: 12.4 K/UL (ref 4.8–10.8)
WBC # BLD AUTO: 22.4 K/UL (ref 4.8–10.8)

## 2019-05-14 PROCEDURE — 85027 COMPLETE CBC AUTOMATED: CPT

## 2019-05-14 PROCEDURE — 700105 HCHG RX REV CODE 258: Performed by: OBSTETRICS & GYNECOLOGY

## 2019-05-14 PROCEDURE — 59409 OBSTETRICAL CARE: CPT

## 2019-05-14 PROCEDURE — 700105 HCHG RX REV CODE 258

## 2019-05-14 PROCEDURE — 770002 HCHG ROOM/CARE - OB PRIVATE (112)

## 2019-05-14 PROCEDURE — 700111 HCHG RX REV CODE 636 W/ 250 OVERRIDE (IP): Performed by: ANESTHESIOLOGY

## 2019-05-14 PROCEDURE — 700101 HCHG RX REV CODE 250

## 2019-05-14 PROCEDURE — 10907ZC DRAINAGE OF AMNIOTIC FLUID, THERAPEUTIC FROM PRODUCTS OF CONCEPTION, VIA NATURAL OR ARTIFICIAL OPENING: ICD-10-PCS | Performed by: OBSTETRICS & GYNECOLOGY

## 2019-05-14 PROCEDURE — 304965 HCHG RECOVERY SERVICES

## 2019-05-14 PROCEDURE — 700111 HCHG RX REV CODE 636 W/ 250 OVERRIDE (IP): Performed by: OBSTETRICS & GYNECOLOGY

## 2019-05-14 PROCEDURE — A9270 NON-COVERED ITEM OR SERVICE: HCPCS | Performed by: OBSTETRICS & GYNECOLOGY

## 2019-05-14 PROCEDURE — 700111 HCHG RX REV CODE 636 W/ 250 OVERRIDE (IP)

## 2019-05-14 PROCEDURE — 303615 HCHG EPIDURAL/SPINAL ANESTHESIA FOR LABOR

## 2019-05-14 PROCEDURE — 36415 COLL VENOUS BLD VENIPUNCTURE: CPT

## 2019-05-14 PROCEDURE — 700102 HCHG RX REV CODE 250 W/ 637 OVERRIDE(OP): Performed by: OBSTETRICS & GYNECOLOGY

## 2019-05-14 PROCEDURE — 59400 OBSTETRICAL CARE: CPT | Performed by: OBSTETRICS & GYNECOLOGY

## 2019-05-14 PROCEDURE — 85025 COMPLETE CBC W/AUTO DIFF WBC: CPT

## 2019-05-14 RX ORDER — VITAMIN A ACETATE, BETA CAROTENE, ASCORBIC ACID, CHOLECALCIFEROL, .ALPHA.-TOCOPHEROL ACETATE, DL-, THIAMINE MONONITRATE, RIBOFLAVIN, NIACINAMIDE, PYRIDOXINE HYDROCHLORIDE, FOLIC ACID, CYANOCOBALAMIN, CALCIUM CARBONATE, FERROUS FUMARATE, ZINC OXIDE, CUPRIC OXIDE 3080; 12; 120; 400; 1; 1.84; 3; 20; 22; 920; 25; 200; 27; 10; 2 [IU]/1; UG/1; MG/1; [IU]/1; MG/1; MG/1; MG/1; MG/1; MG/1; [IU]/1; MG/1; MG/1; MG/1; MG/1; MG/1
1 TABLET, FILM COATED ORAL EVERY MORNING
Status: DISCONTINUED | OUTPATIENT
Start: 2019-05-15 | End: 2019-05-17 | Stop reason: HOSPADM

## 2019-05-14 RX ORDER — SODIUM CHLORIDE, SODIUM LACTATE, POTASSIUM CHLORIDE, CALCIUM CHLORIDE 600; 310; 30; 20 MG/100ML; MG/100ML; MG/100ML; MG/100ML
INJECTION, SOLUTION INTRAVENOUS CONTINUOUS
Status: DISPENSED | OUTPATIENT
Start: 2019-05-14 | End: 2019-05-14

## 2019-05-14 RX ORDER — SODIUM CHLORIDE, SODIUM LACTATE, POTASSIUM CHLORIDE, CALCIUM CHLORIDE 600; 310; 30; 20 MG/100ML; MG/100ML; MG/100ML; MG/100ML
INJECTION, SOLUTION INTRAVENOUS
Status: COMPLETED
Start: 2019-05-14 | End: 2019-05-14

## 2019-05-14 RX ORDER — CITRIC ACID/SODIUM CITRATE 334-500MG
30 SOLUTION, ORAL ORAL EVERY 6 HOURS PRN
Status: DISCONTINUED | OUTPATIENT
Start: 2019-05-14 | End: 2019-05-14 | Stop reason: HOSPADM

## 2019-05-14 RX ORDER — HYDROXYZINE 50 MG/1
50 TABLET, FILM COATED ORAL EVERY 6 HOURS PRN
Status: DISCONTINUED | OUTPATIENT
Start: 2019-05-14 | End: 2019-05-14 | Stop reason: HOSPADM

## 2019-05-14 RX ORDER — OXYCODONE HYDROCHLORIDE 5 MG/1
5 TABLET ORAL EVERY 4 HOURS PRN
Status: DISCONTINUED | OUTPATIENT
Start: 2019-05-14 | End: 2019-05-17 | Stop reason: HOSPADM

## 2019-05-14 RX ORDER — IBUPROFEN 800 MG/1
800 TABLET ORAL EVERY 8 HOURS PRN
Status: DISCONTINUED | OUTPATIENT
Start: 2019-05-14 | End: 2019-05-14

## 2019-05-14 RX ORDER — HYDROCODONE BITARTRATE AND ACETAMINOPHEN 5; 325 MG/1; MG/1
1 TABLET ORAL EVERY 4 HOURS PRN
Status: DISCONTINUED | OUTPATIENT
Start: 2019-05-14 | End: 2019-05-17 | Stop reason: HOSPADM

## 2019-05-14 RX ORDER — BUPIVACAINE HYDROCHLORIDE 2.5 MG/ML
INJECTION, SOLUTION EPIDURAL; INFILTRATION; INTRACAUDAL
Status: COMPLETED
Start: 2019-05-14 | End: 2019-05-14

## 2019-05-14 RX ORDER — OXYCODONE HYDROCHLORIDE 10 MG/1
10 TABLET ORAL EVERY 4 HOURS PRN
Status: DISCONTINUED | OUTPATIENT
Start: 2019-05-14 | End: 2019-05-17 | Stop reason: HOSPADM

## 2019-05-14 RX ORDER — ONDANSETRON 2 MG/ML
4 INJECTION INTRAMUSCULAR; INTRAVENOUS EVERY 6 HOURS PRN
Status: DISCONTINUED | OUTPATIENT
Start: 2019-05-14 | End: 2019-05-17 | Stop reason: HOSPADM

## 2019-05-14 RX ORDER — MISOPROSTOL 200 UG/1
600 TABLET ORAL
Status: DISCONTINUED | OUTPATIENT
Start: 2019-05-14 | End: 2019-05-17 | Stop reason: HOSPADM

## 2019-05-14 RX ORDER — ALUMINA, MAGNESIA, AND SIMETHICONE 2400; 2400; 240 MG/30ML; MG/30ML; MG/30ML
30 SUSPENSION ORAL EVERY 6 HOURS PRN
Status: DISCONTINUED | OUTPATIENT
Start: 2019-05-14 | End: 2019-05-14 | Stop reason: HOSPADM

## 2019-05-14 RX ORDER — ONDANSETRON 4 MG/1
4 TABLET, ORALLY DISINTEGRATING ORAL EVERY 6 HOURS PRN
Status: DISCONTINUED | OUTPATIENT
Start: 2019-05-14 | End: 2019-05-17 | Stop reason: HOSPADM

## 2019-05-14 RX ORDER — ONDANSETRON 2 MG/ML
INJECTION INTRAMUSCULAR; INTRAVENOUS
Status: COMPLETED
Start: 2019-05-14 | End: 2019-05-14

## 2019-05-14 RX ORDER — METHYLERGONOVINE MALEATE 0.2 MG/ML
0.2 INJECTION INTRAVENOUS
Status: DISCONTINUED | OUTPATIENT
Start: 2019-05-14 | End: 2019-05-17 | Stop reason: HOSPADM

## 2019-05-14 RX ORDER — ROPIVACAINE HYDROCHLORIDE 2 MG/ML
INJECTION, SOLUTION EPIDURAL; INFILTRATION; PERINEURAL
Status: COMPLETED
Start: 2019-05-14 | End: 2019-05-14

## 2019-05-14 RX ORDER — SODIUM CHLORIDE, SODIUM LACTATE, POTASSIUM CHLORIDE, CALCIUM CHLORIDE 600; 310; 30; 20 MG/100ML; MG/100ML; MG/100ML; MG/100ML
INJECTION, SOLUTION INTRAVENOUS PRN
Status: DISCONTINUED | OUTPATIENT
Start: 2019-05-14 | End: 2019-05-17 | Stop reason: HOSPADM

## 2019-05-14 RX ORDER — BUPIVACAINE HYDROCHLORIDE 2.5 MG/ML
INJECTION, SOLUTION EPIDURAL; INFILTRATION; INTRACAUDAL PRN
Status: DISCONTINUED | OUTPATIENT
Start: 2019-05-14 | End: 2019-05-14 | Stop reason: SURG

## 2019-05-14 RX ORDER — IBUPROFEN 600 MG/1
600 TABLET ORAL EVERY 6 HOURS PRN
Status: DISCONTINUED | OUTPATIENT
Start: 2019-05-14 | End: 2019-05-17 | Stop reason: HOSPADM

## 2019-05-14 RX ORDER — SODIUM CHLORIDE, SODIUM LACTATE, POTASSIUM CHLORIDE, CALCIUM CHLORIDE 600; 310; 30; 20 MG/100ML; MG/100ML; MG/100ML; MG/100ML
INJECTION, SOLUTION INTRAVENOUS CONTINUOUS
Status: DISCONTINUED | OUTPATIENT
Start: 2019-05-14 | End: 2019-05-17 | Stop reason: HOSPADM

## 2019-05-14 RX ORDER — ACETAMINOPHEN 325 MG/1
325 TABLET ORAL EVERY 4 HOURS PRN
Status: DISCONTINUED | OUTPATIENT
Start: 2019-05-14 | End: 2019-05-17 | Stop reason: HOSPADM

## 2019-05-14 RX ADMIN — SODIUM CHLORIDE, POTASSIUM CHLORIDE, SODIUM LACTATE AND CALCIUM CHLORIDE: 600; 310; 30; 20 INJECTION, SOLUTION INTRAVENOUS at 08:32

## 2019-05-14 RX ADMIN — Medication 125 ML/HR: at 14:40

## 2019-05-14 RX ADMIN — ONDANSETRON 4 MG: 2 INJECTION INTRAMUSCULAR; INTRAVENOUS at 06:00

## 2019-05-14 RX ADMIN — IBUPROFEN 800 MG: 800 TABLET, FILM COATED ORAL at 14:35

## 2019-05-14 RX ADMIN — Medication 2000 ML/HR: at 12:53

## 2019-05-14 RX ADMIN — SODIUM CHLORIDE, POTASSIUM CHLORIDE, SODIUM LACTATE AND CALCIUM CHLORIDE: 600; 310; 30; 20 INJECTION, SOLUTION INTRAVENOUS at 05:36

## 2019-05-14 RX ADMIN — ONDANSETRON 4 MG: 2 INJECTION INTRAMUSCULAR; INTRAVENOUS at 12:09

## 2019-05-14 RX ADMIN — BUPIVACAINE HYDROCHLORIDE 8 ML: 2.5 INJECTION, SOLUTION EPIDURAL; INFILTRATION; INTRACAUDAL; PERINEURAL at 06:55

## 2019-05-14 RX ADMIN — FENTANYL CITRATE 100 MCG: 50 INJECTION, SOLUTION INTRAMUSCULAR; INTRAVENOUS at 06:00

## 2019-05-14 RX ADMIN — SODIUM CHLORIDE, POTASSIUM CHLORIDE, SODIUM LACTATE AND CALCIUM CHLORIDE: 600; 310; 30; 20 INJECTION, SOLUTION INTRAVENOUS at 06:58

## 2019-05-14 RX ADMIN — ROPIVACAINE HYDROCHLORIDE 100 ML: 2 INJECTION, SOLUTION EPIDURAL; INFILTRATION at 06:52

## 2019-05-14 RX ADMIN — OXYCODONE HYDROCHLORIDE 5 MG: 5 TABLET ORAL at 16:35

## 2019-05-14 ASSESSMENT — PATIENT HEALTH QUESTIONNAIRE - PHQ9
2. FEELING DOWN, DEPRESSED, IRRITABLE, OR HOPELESS: NOT AT ALL
1. LITTLE INTEREST OR PLEASURE IN DOING THINGS: NOT AT ALL
SUM OF ALL RESPONSES TO PHQ9 QUESTIONS 1 AND 2: 0
1. LITTLE INTEREST OR PLEASURE IN DOING THINGS: NOT AT ALL
1. LITTLE INTEREST OR PLEASURE IN DOING THINGS: NOT AT ALL

## 2019-05-14 ASSESSMENT — LIFESTYLE VARIABLES
EVER_SMOKED: NEVER
ALCOHOL_USE: NO

## 2019-05-14 ASSESSMENT — PAIN SCALES - GENERAL: PAIN_LEVEL: 0

## 2019-05-14 NOTE — ANESTHESIA PROCEDURE NOTES
Epidural Block  Performed by: ANTHONY OGDEN  Authorized by: ANTHONY OGDEN     Patient Location:  OB  Start Time:  5/14/2019 6:44 AM  End Time:  5/14/2019 6:53 AM  Reason for Block: labor analgesia    patient identified, IV checked, site marked, risks and benefits discussed, surgical consent, monitors and equipment checked, pre-op evaluation and timeout performed    Patient Position:  Sitting  Prep: ChloraPrep, patient draped and sterile technique    Monitoring:  Blood pressure, continuous pulse oximetry and heart rate  Approach:  Midline  Location:  L3-L4  Injection Technique:  NICOLAS saline  Skin infiltration:  Lidocaine  Strength:  1%  Dose:  3ml  Needle Type:  Tuohy  Needle Gauge:  17 G  Needle Length:  3.5 in  Loss of resistance::  4  Catheter Size:  19 G  Catheter at Skin Depth:  10  Test Dose:  Lidocaine 1.5% with epinephrine 1-to-200,000  Test Dose Result:  Negative

## 2019-05-14 NOTE — PROGRESS NOTES
0537 Pt transferred to room 216 via stretcher. Report received, pt care assumed.   0639 Pt sitting for labor epidural  0640 Dr. Paulson at bedside  0641 Epidural time out  0649 Epidural cath placed  0651 Epidural test dose  0655 Epidural continuous pump started  0700 Report to GUERA Kraus RN

## 2019-05-14 NOTE — PROGRESS NOTES
0700 - Report received from TI Lira RN. Pt s/p epidural, feeling comfortable at this time. POC discussed, questions answered. GARCÍA Ferguson at bedside.   0745 - Vann placed, pt tolerated well.   0817 - Dr. Haas at bedside. SVE 6/100/-1. AROM, clear fluid.   1010 - SVE 9/100/-1  1210 - SVE C/+2. Dr. Haas updated.  1235 - Pushing with RN guidance  1250 -  of viable male infant. 8/8 APGARs. 2nd degree, repaired.   1600 - Pt still experiencing heaviness in right leg.   1715 - Pt up to bathroom with steady gait, + void.   1745 - Pt transferred to [facility] via wheelchair in stable condition, pt's mother at side with belongings. REport to RIGO Rivas

## 2019-05-14 NOTE — L&D DELIVERY NOTE
Delivery Summary:     Patient was admitted to Labor and Delivery at 40w5d for active labor.        Preoperative diagnosis: #1 intrauterine pregnancy at 40 weeks and 5 days gestation   #2 labor    Postoperative diagnosis: Same    Procedure: #1 normal spontaneous vaginal delivery.  #2. repair of vaginal laceration    Anesthesia spinal    EBL approximately 300 ml    ELIZABETH NEAL  Is a  , now para: 1, who presented in Active phase labor. at 40w5d  Patient progressed in active labor: spontaneously with pitocin augmentation performed  to Cervical Exam:  100%; cervical dilatation:10, station: +2, to complete the first stage of labor   Patient then progressed through second stage  and delivered, a viable male infant over an intact  perineum. Apgar:8/9   Nuchal Cord: none  Third Stage:Placenta delivered spontaneously intact yes. 3-Vessel cord: yes}  Episiotomy:  Midline vaginal Laceration Repaired in classical manner with 3-0 ChromicAnagelsia: local   Epidural : positive  Family support: Yes  Infant bonding good:positive  EBL: 250 ML  Sponge count correct: Yes  Patient tolerated this procedure well

## 2019-05-14 NOTE — ANESTHESIA PREPROCEDURE EVALUATION
Active labor, otherwise healthy    Relevant Problems   No relevant active problems       Physical Exam    Airway   Mallampati: II  TM distance: >3 FB  Neck ROM: full       Cardiovascular - normal exam  Rhythm: regular  Rate: normal  (-) murmur     Dental - normal exam         Pulmonary - normal exam  Breath sounds clear to auscultation     Abdominal    Neurological - normal exam                 Anesthesia Plan    ASA 2       Plan - epidural   Neuraxial block will be labor analgesia              Pertinent diagnostic labs and testing reviewed    Informed Consent:    Anesthetic plan and risks discussed with patient.

## 2019-05-14 NOTE — H&P
"History and Physical      Monalisa Mcknight is a 33 y.o. year old female  at 40w5d who presents for contractions since 0200.    Subjective:   positive  For CTXS.   positive Feels pain   negative for LOF  negative for vaginal bleeding.   positive for fetal movement    ROS: Pertinent items are noted in HPI.    History reviewed. No pertinent past medical history.  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     Social History Main Topics   • Smoking status: Never Smoker   • Smokeless tobacco: Never Used   • Alcohol use No   • Drug use: No   • Sexual activity: Yes     Partners: Male     Other Topics Concern   • Not on file     Social History Narrative   • No narrative on file     Allergies: Patient has no known allergies.    Current Facility-Administered Medications:   •  lactated ringers infusion, , Intravenous, Continuous, Ariana Juarez M.D., Last Rate: 125 mL/hr at 19 0536  •  LACTATED RINGERS IV SOLN, , , ,   •  ROPIVACAINE HCL 2 MG/ML INJ SOLN, , , ,     Prenatal care with [facility] starting at 10 weeks with following problems:  Patient Active Problem List    Diagnosis Date Noted   • Supervision of other normal pregnancy, antepartum 2018               Objective:      /77   Pulse 63   Temp 36.4 °C (97.5 °F) (Temporal)   Ht 1.626 m (5' 4\")   Wt 70.3 kg (155 lb)     General:   no acute distress   Skin:   normal   HEENT:  PERRLA   Lungs:   CTA bilateral   Heart:   S1, S2 " normal, no murmur, click, rub or gallop, regular rate and rhythm, brisk carotid upstroke without bruits, peripheral pulses very brisk, chest is clear without rales or wheezing, no pedal edema, no JVD, no hepatosplenomegaly   Abdomen:   gravid, NT   EFW:  3500 grams   Pelvis:  adequate with gynecoid pelvis   FHT:  130s BPM, moderate variability, + accels   Uterine Size: S=D   Presentations: Cephalic   Cervix:     Dilation: 5-6 cm/100%/-2    Effacement: 100%    Station:  -2    Consistency: Soft    Position: Anterior     Lab Review  Lab:   Blood type: O     Recent Results (from the past 5880 hour(s))   CHLAMYDIA/GC PCR URINE OR SWAB    Collection Time: 09/11/18  9:03 AM   Result Value Ref Range    Source Genital     C. trachomatis by PCR Negative Negative    N. gonorrhoeae by PCR Negative Negative   PRENATAL PANEL 3+HIV+UACXI    Collection Time: 10/23/18  8:16 AM   Result Value Ref Range    Color Yellow     Character Cloudy (A)     Specific Gravity 1.022 <1.035    Ph 6.0 5.0 - 8.0    Glucose Negative Negative mg/dL    Ketones Trace (A) Negative mg/dL    Protein Negative Negative mg/dL    Bilirubin Negative Negative    Urobilinogen, Urine 1.0 Negative    Nitrite Negative Negative    Leukocyte Esterase Moderate (A) Negative    Occult Blood Negative Negative    Micro Urine Req Microscopic     WBC 8.8 4.8 - 10.8 K/uL    RBC 4.32 4.20 - 5.40 M/uL    Hemoglobin 13.2 12.0 - 16.0 g/dL    Hematocrit 40.8 37.0 - 47.0 %    MCV 94.4 81.4 - 97.8 fL    MCH 30.6 27.0 - 33.0 pg    MCHC 32.4 (L) 33.6 - 35.0 g/dL    RDW 46.4 35.9 - 50.0 fL    Platelet Count 234 164 - 446 K/uL    MPV 11.1 9.0 - 12.9 fL    Neutrophils-Polys 74.00 (H) 44.00 - 72.00 %    Lymphocytes 18.90 (L) 22.00 - 41.00 %    Monocytes 6.00 0.00 - 13.40 %    Eosinophils 0.30 0.00 - 6.90 %    Basophils 0.50 0.00 - 1.80 %    Immature Granulocytes 0.30 0.00 - 0.90 %    Nucleated RBC 0.00 /100 WBC    Neutrophils (Absolute) 6.49 2.00 - 7.15 K/uL    Lymphs (Absolute) 1.66 1.00  - 4.80 K/uL    Monos (Absolute) 0.53 0.00 - 0.85 K/uL    Eos (Absolute) 0.03 0.00 - 0.51 K/uL    Baso (Absolute) 0.04 0.00 - 0.12 K/uL    Immature Granulocytes (abs) 0.03 0.00 - 0.11 K/uL    NRBC (Absolute) 0.00 K/uL    Rubella IgG Antibody 45.70 IU/mL    Syphilis, Treponemal Qual Non Reactive Non Reactive    Hepatitis B Surface Antigen Negative Negative   URINE CULTURE(NEW)    Collection Time: 10/23/18  8:16 AM   Result Value Ref Range    Significant Indicator NEG     Source UR     Site      Urine Culture Mixed skin erlin >100,000 cfu/mL    SPINAL MUSCULAR ATROPHY COPY NUMBER ANALYSIS    Collection Time: 10/23/18  8:16 AM   Result Value Ref Range    Specimen, SMA Copy Number Whole Blood     Symptoms, SMA Copy Number Unknown     SMN1 Copies, SMA Copy Number 2 copies     SMN2 Copies, SMA Copy Number 1 copy     Linked Variant, SMA Copy Number Not Present     Int, SMA Copy Number See Note    HIV AG/AB COMBO ASSAY SCREENING    Collection Time: 10/23/18  8:16 AM   Result Value Ref Range    HIV Ag/Ab Combo Assay Non Reactive Non Reactive   URINE MICROSCOPIC (W/UA)    Collection Time: 10/23/18  8:16 AM   Result Value Ref Range    WBC 10-20 (A) /hpf    RBC  /hpf    Bacteria Moderate (A) None /hpf    Epithelial Cells Moderate (A) /hpf    Hyaline Cast 6-10 (A) /lpf   CYSTIC FIBROSIS(CFTR)165 PATHOGENIC VARIANTS    Collection Time: 10/23/18  8:16 AM   Result Value Ref Range    Cystic Fibrosis, Allele 1 Negative     Cystic Fibrosis, Allele 2 Negative     Cystic Fibrosis, 5T Variant Not Applicable     Cystic Fibrosis, 165 Variants, Interp 0 variants    OP PRENATAL PANEL-BLOOD BANK    Collection Time: 10/23/18  8:20 AM   Result Value Ref Range    ABO Grouping Only O     Rh Grouping Only POS     Antibody Screen Scrn NEG    AFP TETRA    Collection Time: 12/12/18  8:15 AM   Result Value Ref Range    . Report     Test Results *Screen Negative*     Gestation Age 18.9 WEEKS    Gestational Age Based On CLAUDIA     Maternal [age] at CLAUDIA 32.3  yr    Race      Maternal Weight 126 lbs    Insulin Dependent Diabetes No     Multiple Pregnancy No     AFP Value -Eia 60.8 ng/mL    AFP MOM Value 1.13     Hcg Value 18,153 mIU/mL    Hcg Mom 0.65     Ue3 Value 1.74 ng/mL    Ue3 Mom 1.06     Sandra Value -Eia 377.08 pg/mL    Sandra Mom Value 1.90     Osbr Risk 8,106     Dsr 2Nd Trimester 1,999     Dsr By Age 502     T18 Risk Not increased     T18 By Age 1:1953     Interpretation Comment     Comments Comment    CBC WITHOUT DIFFERENTIAL    Collection Time: 02/21/19  9:59 AM   Result Value Ref Range    WBC 15.7 (H) 4.8 - 10.8 K/uL    RBC 3.80 (L) 4.20 - 5.40 M/uL    Hemoglobin 11.7 (L) 12.0 - 16.0 g/dL    Hematocrit 37.2 37.0 - 47.0 %    MCV 97.9 (H) 81.4 - 97.8 fL    MCH 30.8 27.0 - 33.0 pg    MCHC 31.5 (L) 33.6 - 35.0 g/dL    RDW 49.8 35.9 - 50.0 fL    Platelet Count 255 164 - 446 K/uL    MPV 10.5 9.0 - 12.9 fL   GLUCOSE 1HR GESTATIONAL    Collection Time: 02/21/19  9:59 AM   Result Value Ref Range    Glucose, Post Dose 164 (H) 70 - 139 mg/dL   T.PALLIDUM AB EIA    Collection Time: 02/21/19  9:59 AM   Result Value Ref Range    Syphilis, Treponemal Qual Non Reactive Non Reactive   GLUCOSE TOLERANCE 3 HOUR    Collection Time: 03/16/19  7:10 AM   Result Value Ref Range    Glucose, Fasting 73 65 - 99 mg/dL    Glucose 1 Hour 177 65 - 199 mg/dL    Glucose 2 Hour 96 65 - 139 mg/dL    Glucose 3 Hour 94 65 - 109 mg/dL   GRP B STREP, BY PCR (GALLARDO BROTH)    Collection Time: 04/12/19 10:36 AM   Result Value Ref Range    Strep Gp B DNA PCR Negative Negative   CBC WITH DIFFERENTIAL    Collection Time: 05/14/19  5:30 AM   Result Value Ref Range    WBC 12.4 (H) 4.8 - 10.8 K/uL    RBC 4.16 (L) 4.20 - 5.40 M/uL    Hemoglobin 12.4 12.0 - 16.0 g/dL    Hematocrit 39.9 37.0 - 47.0 %    MCV 95.9 81.4 - 97.8 fL    MCH 29.8 27.0 - 33.0 pg    MCHC 31.1 (L) 33.6 - 35.0 g/dL    RDW 52.5 (H) 35.9 - 50.0 fL    Platelet Count 235 164 - 446 K/uL    MPV 11.6 9.0 - 12.9 fL    Neutrophils-Polys 71.90  44.00 - 72.00 %    Lymphocytes 16.50 (L) 22.00 - 41.00 %    Monocytes 8.90 0.00 - 13.40 %    Eosinophils 0.60 0.00 - 6.90 %    Basophils 0.60 0.00 - 1.80 %    Immature Granulocytes 1.50 (H) 0.00 - 0.90 %    Nucleated RBC 0.00 /100 WBC    Neutrophils (Absolute) 8.90 (H) 2.00 - 7.15 K/uL    Lymphs (Absolute) 2.04 1.00 - 4.80 K/uL    Monos (Absolute) 1.10 (H) 0.00 - 0.85 K/uL    Eos (Absolute) 0.07 0.00 - 0.51 K/uL    Baso (Absolute) 0.08 0.00 - 0.12 K/uL    Immature Granulocytes (abs) 0.18 (H) 0.00 - 0.11 K/uL    NRBC (Absolute) 0.00 K/uL   HOLD BLOOD BANK SPECIMEN (NOT TESTED)    Collection Time: 19  5:30 AM   Result Value Ref Range    Holding Tube - Bb DONE         Assessment:   Monalisa Mcknight at 40w5d  Labor status: Active phase labor.  Spontaneous deceleration upon admission, fetal status reassuring since deceleration.      Obstetrical history significant for   Patient Active Problem List    Diagnosis Date Noted   • Supervision of other normal pregnancy, antepartum 2018   .      Plan:     Admit to L&D  GBS negative  Epidural for pain control  Anticipate

## 2019-05-14 NOTE — PROGRESS NOTES
"Obstetrics & Gynecology Labor Progress Note    Date of Service  2019    Subjective  33 y.o. female admitted on 2019 with:  Active Problems:  IUP@ 40W5D  Labor  S/P epidural    Patient is comfortable currently with epidural and has no concerns or complaints.      Objective  /69   Pulse 70   Temp 36.7 °C (98 °F) (Temporal)   Resp 16   Ht 1.626 m (5' 4\")   Wt 70.3 kg (155 lb)   LMP 2018   SpO2 97%   BMI 26.61 kg/m²   Sterile Vaginal Exam: Dilation: 6 Effacement (%): 100 Fetal Station: -1  Fetal Heart Tracing: Baseline Rate: 115 bpm Variability: 6-25 Accelerations: Present Decels: Absent Mode: External US    [place]: Mode: External [place] Contraction Frequency: 1-4   Bloody show noted on exam  Amniotomy discussed with patient and patient consented verbally.  Amniotomy was performed with a moderate amount of clear fluid obtained    Assessment  33 y.o.  at 40w5d   Active labor  Status post amniotomy  Status post epidural  Category 1 fetal heart rate tracing      Plan  We will continue present management  Labor management and plan of care reviewed with patient and her spouse.    Clay Haas M.D.        "

## 2019-05-14 NOTE — CARE PLAN
Problem: Infection  Goal: Will remain free from infection    Intervention: Assess signs and symptoms of infection  Pt remains free from s/s of infection        Problem: Knowledge Deficit  Goal: Knowledge of disease process/condition, treatment plan, diagnostic tests, and medications will improve    Intervention: Explain information regarding disease process/condition, treatment plan, diagnostic tests, and medications and document in education  Pt educated on POC

## 2019-05-15 ENCOUNTER — APPOINTMENT (OUTPATIENT)
Dept: OBGYN | Facility: MEDICAL CENTER | Age: 34
End: 2019-05-15
Payer: COMMERCIAL

## 2019-05-15 PROCEDURE — 700102 HCHG RX REV CODE 250 W/ 637 OVERRIDE(OP): Performed by: STUDENT IN AN ORGANIZED HEALTH CARE EDUCATION/TRAINING PROGRAM

## 2019-05-15 PROCEDURE — 302152 K-PAD 12X17: Performed by: OBSTETRICS & GYNECOLOGY

## 2019-05-15 PROCEDURE — 700102 HCHG RX REV CODE 250 W/ 637 OVERRIDE(OP): Performed by: OBSTETRICS & GYNECOLOGY

## 2019-05-15 PROCEDURE — A9270 NON-COVERED ITEM OR SERVICE: HCPCS | Performed by: OBSTETRICS & GYNECOLOGY

## 2019-05-15 PROCEDURE — 770002 HCHG ROOM/CARE - OB PRIVATE (112)

## 2019-05-15 PROCEDURE — A9270 NON-COVERED ITEM OR SERVICE: HCPCS | Performed by: ADVANCED PRACTICE MIDWIFE

## 2019-05-15 PROCEDURE — A9270 NON-COVERED ITEM OR SERVICE: HCPCS | Performed by: STUDENT IN AN ORGANIZED HEALTH CARE EDUCATION/TRAINING PROGRAM

## 2019-05-15 PROCEDURE — 302131 K PAD MOTOR: Performed by: OBSTETRICS & GYNECOLOGY

## 2019-05-15 PROCEDURE — 700112 HCHG RX REV CODE 229: Performed by: ADVANCED PRACTICE MIDWIFE

## 2019-05-15 RX ORDER — FERROUS SULFATE 325(65) MG
325 TABLET ORAL
Status: DISCONTINUED | OUTPATIENT
Start: 2019-05-15 | End: 2019-05-17 | Stop reason: HOSPADM

## 2019-05-15 RX ORDER — ASCORBIC ACID 500 MG
500 TABLET ORAL DAILY
Status: DISCONTINUED | OUTPATIENT
Start: 2019-05-15 | End: 2019-05-17 | Stop reason: HOSPADM

## 2019-05-15 RX ORDER — DOCUSATE SODIUM 100 MG/1
100 CAPSULE, LIQUID FILLED ORAL 2 TIMES DAILY PRN
Status: DISCONTINUED | OUTPATIENT
Start: 2019-05-15 | End: 2019-05-17 | Stop reason: HOSPADM

## 2019-05-15 RX ADMIN — Medication 1 TABLET: at 06:42

## 2019-05-15 RX ADMIN — FERROUS SULFATE TAB 325 MG (65 MG ELEMENTAL FE) 325 MG: 325 (65 FE) TAB at 11:09

## 2019-05-15 RX ADMIN — DOCUSATE SODIUM 100 MG: 100 CAPSULE, LIQUID FILLED ORAL at 21:44

## 2019-05-15 RX ADMIN — OXYCODONE HYDROCHLORIDE 5 MG: 5 TABLET ORAL at 17:04

## 2019-05-15 RX ADMIN — IBUPROFEN 600 MG: 600 TABLET ORAL at 17:06

## 2019-05-15 RX ADMIN — OXYCODONE HYDROCHLORIDE 5 MG: 5 TABLET ORAL at 11:09

## 2019-05-15 RX ADMIN — ACETAMINOPHEN 325 MG: 325 TABLET, FILM COATED ORAL at 19:46

## 2019-05-15 RX ADMIN — OXYCODONE HYDROCHLORIDE AND ACETAMINOPHEN 500 MG: 500 TABLET ORAL at 11:09

## 2019-05-15 RX ADMIN — OXYCODONE HYDROCHLORIDE 5 MG: 5 TABLET ORAL at 21:44

## 2019-05-15 RX ADMIN — OXYCODONE HYDROCHLORIDE 5 MG: 5 TABLET ORAL at 04:03

## 2019-05-15 NOTE — CARE PLAN
Problem: Alteration in comfort related to episiotomy, vaginal repair and/or after birth pains  Goal: Patient verbalizes acceptable pain level  Outcome: PROGRESSING AS EXPECTED  Patient states pain has been well under control. Comfort at rest. Patient requests to have medications as needed and will call RN for medications.

## 2019-05-15 NOTE — PROGRESS NOTES
Obstetrics & Gynecology Post-Delivery Progress Note    Date of Service      33 y.o.  s/p vaginal, spontaneous  Delivery date: 2019    Events  No events    Subjective  Pain: Yes,  controlled, crampy abdominal pain  Bleeding: lochia moderate  PO's: taking clears  Voiding: without difficulty  Ambulating: yes  Passing flatus: Yes  Feeding: breastfeeding with pump at bedside; having concerns about milk supply. Discussed natural course for establishing breastfeeding and expectations. Patient looks forward to lactation consultants visit.    Objective  Temp:  [36.4 °C (97.6 °F)-36.9 °C (98.5 °F)] 36.4 °C (97.6 °F)  Pulse:  [] 74  Resp:  [16-20] 18  BP: ()/(51-85) 107/54  SpO2:  [97 %-100 %] 98 %    Physical Exam  General: well  Chest/Breasts: deferred per patient   Abdomen: soft, non-distended, NTTP. Normoactive bowel sounds.  Fundus: firm, below umbilicus. Mildly tender to palpation.  Incision: not applicable, (vaginal delivery)  Perineum: well approximated, moderate lochia rubra  Extremities: 1+ edema to lower calf, calves nontender    Recent Labs      19   0530  19   2220   WBC  12.4*  22.4*   RBC  4.16*  3.12*   HEMOGLOBIN  12.4  9.9*   HEMATOCRIT  39.9  30.2*   MCV  95.9  95.5   MCH  29.8  31.1   RDW  52.5*  52.1*   PLATELETCT  235  175   MPV  11.6  11.2   NEUTSPOLYS  71.90   --    LYMPHOCYTES  16.50*   --    MONOCYTES  8.90   --    EOSINOPHILS  0.60   --    BASOPHILS  0.60   --        Assessment/Plan  Monalisa Mcknight is a 33 y.o.yo  doing well on postpartum day #1  s/p .    1. Post care: meeting all goals  2. Hemodynamics: Appropriate drop in Hgb following delivery. Will start iron supplementation given Hgb <10.  3. Pain: controlled  4. Rh+, Rubella Immune  5. Method of Feeding: plans to breastfeed  6. Method of Contraception: undecided  7. Disposition: likely home postpartum day 2    VTE prophylaxis: SCDs

## 2019-05-15 NOTE — LACTATION NOTE
"Infant is currently in NICU for blood sugar instability. Review of pumping with purpose, technique, settings, and cleaning of parts. MOB has had suction set at 8% and is not getting any results. States that the suction hurts her. Attempt HE without results, but MOB is very sensitive to touch. Info given to FOB on [employer] weblinks to videos which teach Hand expression, Hands-on pumping, and  \"Breastfeeding in the First Hour\"; encouraged to watch.  Flange size check to nipple (25mm) which was correct sizing and started pumping with teaching done on the need for more suction. Suction set @25% and MOB stated that it felt similar to the 8%. Teaching done on the purpose of pumping to move milk and stimulate milk production while infant is  from her. Both parents voice understanding of teaching. Encouraged to call for assist as needed.   "

## 2019-05-15 NOTE — ANESTHESIA TIME REPORT
Anesthesia Start and Stop Event Times     Date Time Event    5/14/2019 0638 Anesthesia Start     1250 Anesthesia Stop        Responsible Staff  05/14/19    Name Role Begin End    Marty Paulson M.D. Anesth 0638 0700    Wade Olmos M.D. Anesth 0700 1250        Preop Diagnosis (Free Text):  Pre-op Diagnosis             Preop Diagnosis (Codes):    Post op Diagnosis  Pregnancy      Premium Reason  A. 3PM - 7AM    Comments: premium for Lorene

## 2019-05-15 NOTE — ANESTHESIA POSTPROCEDURE EVALUATION
Patient: Monalisa Mcknight    Procedure Summary     Date:  05/14/19 Room / Location:      Anesthesia Start:  0638 Anesthesia Stop:  1250    Procedure:  Labor Epidural Diagnosis:      Scheduled Providers:   Responsible Provider:  Wade Olmos M.D.    Anesthesia Type:  epidural ASA Status:  2          Final Anesthesia Type: epidural  Last vitals  BP   Blood Pressure: 101/55    Temp   36.6 °C (97.9 °F)    Pulse   Pulse: 95   Resp   20    SpO2   97 %      Anesthesia Post Evaluation    Patient location during evaluation: PACU  Patient participation: complete - patient participated  Level of consciousness: awake and alert  Pain score: 0    Airway patency: patent  Anesthetic complications: no  Cardiovascular status: hemodynamically stable  Respiratory status: acceptable  Hydration status: euvolemic    PONV: none           Nurse Pain Score: 2 (NPRS)

## 2019-05-15 NOTE — CARE PLAN
Problem: Altered physiologic condition related to immediate post-delivery state and potential for bleeding/hemorrhage  Goal: Patient physiologically stable as evidenced by normal lochia, palpable uterine involution and vital signs within normal limits  Outcome: PROGRESSING AS EXPECTED  Fundus firm at the umbilicus. Light lochia rubra. Vital signs stable and within parameters.

## 2019-05-15 NOTE — PROGRESS NOTES
0700 Received report from RIGO Sorto. Assumed care of patient.   0810 Assessment complete. Fundus firm, at the umbilicus. Scant lochia rubra, not passing clots. Edema on right foot, pitting. No hemorrhoids, slight perineal edema. Denies pain at this time. Patient states she will call with any pain necesseities and medications as needed. Bed is locked and in low position. Call light left within reach and encouraged to call for any needs if necessary.

## 2019-05-15 NOTE — PROGRESS NOTES
1900-- Received report from RIGO Campo, Infant in NICU.  Pt in NICU visiting infant.Rounding in place.  1945-- Pt returns from NICU. Assessment completed, VSS, ambulated to bathroom by MOISE Zamora.  Pt has no epidural in place.  Pt declines the need for pain intervention at this time.  Discussed plan of care for the night that pt is comfortable with.  All questions answered at this time.

## 2019-05-15 NOTE — PROGRESS NOTES
Patient brought from labor and delivery via wheelchair.  Patient oriented to room and surroundings. Id bands and security issues discussed. Including not letting anyone take infant without pink photo id. No sleeping with infant, no carrying infant in halls, and no leaving infant unattended. 0-10 pain scale and pain management discussed. Fundus firm with light lochia. IV infusing without redness or swelling.  Family at bedside.  Patient encouraged to call before getting out of bed until stable.  POC discussed. Pt helped to wheelchair to see infant who is in nbn

## 2019-05-16 VITALS
RESPIRATION RATE: 16 BRPM | TEMPERATURE: 98.1 F | HEART RATE: 69 BPM | WEIGHT: 155 LBS | HEIGHT: 64 IN | DIASTOLIC BLOOD PRESSURE: 60 MMHG | BODY MASS INDEX: 26.46 KG/M2 | OXYGEN SATURATION: 94 % | SYSTOLIC BLOOD PRESSURE: 103 MMHG

## 2019-05-16 PROCEDURE — 700102 HCHG RX REV CODE 250 W/ 637 OVERRIDE(OP): Performed by: OBSTETRICS & GYNECOLOGY

## 2019-05-16 PROCEDURE — A9270 NON-COVERED ITEM OR SERVICE: HCPCS | Performed by: STUDENT IN AN ORGANIZED HEALTH CARE EDUCATION/TRAINING PROGRAM

## 2019-05-16 PROCEDURE — A9270 NON-COVERED ITEM OR SERVICE: HCPCS | Performed by: OBSTETRICS & GYNECOLOGY

## 2019-05-16 PROCEDURE — 700102 HCHG RX REV CODE 250 W/ 637 OVERRIDE(OP): Performed by: STUDENT IN AN ORGANIZED HEALTH CARE EDUCATION/TRAINING PROGRAM

## 2019-05-16 RX ORDER — PSEUDOEPHEDRINE HCL 30 MG
100 TABLET ORAL 2 TIMES DAILY PRN
Qty: 30 CAP | Refills: 0 | Status: SHIPPED | OUTPATIENT
Start: 2019-05-16 | End: 2019-05-16

## 2019-05-16 RX ORDER — FERROUS SULFATE 325(65) MG
325 TABLET ORAL
Qty: 90 TAB | Refills: 0 | Status: SHIPPED | OUTPATIENT
Start: 2019-05-16 | End: 2022-12-10

## 2019-05-16 RX ORDER — IBUPROFEN 600 MG/1
600 TABLET ORAL EVERY 6 HOURS PRN
Qty: 60 TAB | Refills: 0 | Status: SHIPPED | OUTPATIENT
Start: 2019-05-16 | End: 2023-10-11

## 2019-05-16 RX ORDER — PSEUDOEPHEDRINE HCL 30 MG
100 TABLET ORAL 2 TIMES DAILY PRN
Qty: 30 CAP | Refills: 0 | Status: SHIPPED | OUTPATIENT
Start: 2019-05-16 | End: 2022-12-10

## 2019-05-16 RX ADMIN — HYDROCODONE BITARTRATE AND ACETAMINOPHEN 1 TABLET: 5; 325 TABLET ORAL at 17:09

## 2019-05-16 RX ADMIN — IBUPROFEN 600 MG: 600 TABLET ORAL at 13:09

## 2019-05-16 RX ADMIN — IBUPROFEN 600 MG: 600 TABLET ORAL at 05:03

## 2019-05-16 RX ADMIN — FERROUS SULFATE TAB 325 MG (65 MG ELEMENTAL FE) 325 MG: 325 (65 FE) TAB at 07:54

## 2019-05-16 RX ADMIN — Medication 1 TABLET: at 05:03

## 2019-05-16 RX ADMIN — OXYCODONE HYDROCHLORIDE AND ACETAMINOPHEN 500 MG: 500 TABLET ORAL at 07:54

## 2019-05-16 RX ADMIN — HYDROCODONE BITARTRATE AND ACETAMINOPHEN 1 TABLET: 5; 325 TABLET ORAL at 13:09

## 2019-05-16 RX ADMIN — OXYCODONE HYDROCHLORIDE 10 MG: 10 TABLET ORAL at 05:04

## 2019-05-16 ASSESSMENT — EDINBURGH POSTNATAL DEPRESSION SCALE (EPDS)
THINGS HAVE BEEN GETTING ON TOP OF ME: NO, I HAVE BEEN COPING AS WELL AS EVER
I HAVE BEEN SO UNHAPPY THAT I HAVE HAD DIFFICULTY SLEEPING: NOT AT ALL
I HAVE BEEN SO UNHAPPY THAT I HAVE BEEN CRYING: ONLY OCCASIONALLY
I HAVE FELT SCARED OR PANICKY FOR NO GOOD REASON: NO, NOT AT ALL
I HAVE FELT SAD OR MISERABLE: NOT VERY OFTEN
I HAVE LOOKED FORWARD WITH ENJOYMENT TO THINGS: AS MUCH AS I EVER DID
I HAVE BEEN ABLE TO LAUGH AND SEE THE FUNNY SIDE OF THINGS: AS MUCH AS I ALWAYS COULD
THE THOUGHT OF HARMING MYSELF HAS OCCURRED TO ME: NEVER
I HAVE BEEN ANXIOUS OR WORRIED FOR NO GOOD REASON: NO, NOT AT ALL
I HAVE BLAMED MYSELF UNNECESSARILY WHEN THINGS WENT WRONG: NOT VERY OFTEN

## 2019-05-16 NOTE — PROGRESS NOTES
Parents going to go to infant care time in NICU at 1730 then leave unit to go to [facility].  Charge RN notified

## 2019-05-16 NOTE — PROGRESS NOTES
1900- Assumed care of patient, report from RIGO Waggoner.     1945- Patient assessment complete, VSS, fundus firm, light lochia.  Plan of care discussed, all questions answered at this time, will continue to monitor.

## 2019-05-16 NOTE — CARE PLAN
Problem: Communication  Goal: The ability to communicate needs accurately and effectively will improve  Outcome: PROGRESSING AS EXPECTED  Oriented mother to POC for shift and she VU.  Questions/concerns addressed.  Communication board updated.  Mother has call light w/in reach.

## 2019-05-16 NOTE — LACTATION NOTE
Mom Primip 40.5 wk male delivered Vag. Baby had low and unstable blood sugars and was transferred to NICU. Mom pumping ATC and will be discharge home this evening and will be staying at the [facility]. Mom has a Medela pump at home. DARRIN recommended a hospital grade pump to surge milk supply while she is absent from baby then resume using personal pump once milk is established. Mom hopeful that she will produce enough milk. Supported mom's efforts.

## 2019-05-16 NOTE — DISCHARGE PLANNING
Discharge Planning Assessment Post Partum    Reason for Referral: NICU-40.5 weeks  Address: [address] [place] [address]  Phone: [phone]  Type of Living Situation: living with FOB  Mom Diagnosis: Pregnancy  Baby Diagnosis: , low glucose, low tone, apnea  Primary Language: English    Name of Baby: Augustine Mcknight (: 19)  Father of the Baby: Marty Mcknight   Involved in baby’s care? Yes  Contact Information: [phone]    Prenatal Care: Yes  Mom's PCP: None  PCP for new baby: Dr. Sanchez    Support System: FOB and family  Coping/Bonding between mother & baby: Yes  Source of Feeding: MOB is pumping  Supplies for Infant: prepared for infant; denies any needs    Mom's Insurance: [place]  Baby Covered on Insurance:Yes  Mother Employed/School: [employer]  Other children in the home/names & ages: 1st baby    Financial Hardship/Income: denies   Mom's Mental status: alert and oriented  Services used prior to admit: none    CPS History: No  Psychiatric History: No  Domestic Violence History: No  Drug/ETOH History: No    Resources Provided: children and family resource list, counseling resources for post partum depression, and contact information to JULIETH Bradley  Referrals Made: referral made to the [facility].  Spoke with Leena and parents can check-in today between 2:30-7:30 pm.  MOB is aware.  Provided her with directions and information to [facility].     Clearance for Discharge: Infant is cleared to discharge home with parents once medically stable.    Ongoing Plan: SW will continue to follow and assist as needed.

## 2019-05-16 NOTE — CARE PLAN
Problem: Pain Management  Goal: Pain level will decrease to patient's comfort goal  Outcome: PROGRESSING AS EXPECTED  Patient reports moderate to high pain, report relief with PRN medications and heat pad. Will continue to monitor.     Problem: Altered physiologic condition related to immediate post-delivery state and potential for bleeding/hemorrhage  Goal: Patient physiologically stable as evidenced by normal lochia, palpable uterine involution and vital signs within normal limits  Outcome: PROGRESSING AS EXPECTED  VSS, fundus firm, light lochia, will continue to monitor.

## 2019-05-16 NOTE — DISCHARGE SUMMARY
Discharge Summary:      Monalisa Mcknight      Admit Date:   2019  Discharge Date:  2019     Admitting diagnosis:  Pregnancy  Labor and delivery indication for care or intervention  Discharge Diagnosis: Status post Spontaneous Vaginal Delivery  Pregnancy Complications: None  Tubal Ligation:  No        History:  History reviewed. No pertinent past medical history.  OB History    Para Term  AB Living   3 1 1 0 2 1   SAB TAB Ectopic Molar Multiple Live Births   0 2 0 0 0 1      # Outcome Date GA Lbr Pratik/2nd Weight Sex Delivery Anes PTL Lv   3 Term 19 40w5d 10:10 / 00:40 3.69 kg (8 lb 2.2 oz) M Vag-Spont EPI N MALIK   2 TAB            1 TAB                    Patient has no known allergies.  Patient Active Problem List    Diagnosis Date Noted   • Supervision of other normal pregnancy, antepartum 2018        Hospital Course:   33 y.o. , now , was admitted with the above mentioned diagnosis, underwent spontaneous vaginal delivery. Patient postpartum course was unremarkable, with progressive advancement in diet, ambulation and toleration of oral analgesia. Patient without complaints today and desires discharge.      Abdominal pain: controlled  Ambulating: yes  tolerating liquids: yes  tolerating regular diet: yes  Flatus: yes  BM: not yet  Bleeding: light lochia rubra  Voiding: yes  Dizziness: minimal  Breast feeding: pumping  Breast tenderness: no    Vitals:    19 1721 19 2200 05/15/19 0600 05/15/19 1800   BP: 101/55 107/54 (!) 97/58 116/72   Pulse: 95 74 76 82   Resp: 20 18 18 20   Temp: 36.6 °C (97.9 °F) 36.4 °C (97.6 °F) 37.1 °C (98.7 °F) 36.9 °C (98.5 °F)   TempSrc: Temporal Temporal Temporal Temporal   SpO2: 97% 98% 94% 97%   Weight:       Height:           Current Facility-Administered Medications   Medication Dose   • ferrous sulfate tablet 325 mg  325 mg   • ascorbic acid tablet 500 mg  500 mg   • docusate sodium (COLACE) capsule 100 mg  100 mg   • lactated  ringers infusion     • ondansetron (ZOFRAN ODT) dispertab 4 mg  4 mg    Or   • ondansetron (ZOFRAN) syringe/vial injection 4 mg  4 mg   • oxytocin (PITOCIN) infusion (for postpartum)   mL/hr   • acetaminophen (TYLENOL) tablet 325 mg  325 mg   • oxyCODONE immediate-release (ROXICODONE) tablet 5 mg  5 mg   • oxyCODONE immediate release (ROXICODONE) tablet 10 mg  10 mg   • LR infusion     • PRN oxytocin (PITOCIN) (20 Units/1000 mL) PRN for excessive uterine bleeding - See Admin Instr  125-999 mL/hr   • miSOPROStol (CYTOTEC) tablet 600 mcg  600 mcg   • methylergonovine (METHERGINE) injection 0.2 mg  0.2 mg   • prenatal plus vitamin (STUARTNATAL 1+1) 27-1 MG tablet 1 Tab  1 Tab   • ibuprofen (MOTRIN) tablet 600 mg  600 mg   • HYDROcodone-acetaminophen (NORCO) 5-325 MG per tablet 1 Tab  1 Tab       Exam:  Vitals:    05/14/19 1721 05/14/19 2200 05/15/19 0600 05/15/19 1800   BP: 101/55 107/54 (!) 97/58 116/72   Pulse: 95 74 76 82   Resp: 20 18 18 20   Temp: 36.6 °C (97.9 °F) 36.4 °C (97.6 °F) 37.1 °C (98.7 °F) 36.9 °C (98.5 °F)   TempSrc: Temporal Temporal Temporal Temporal   SpO2: 97% 98% 94% 97%   Weight:       Height:         General: No acute distress, resting comfortably in bed.  HEENT: normocephalic, nontraumatic, EOMI  Cardiovascular: Heart RRR with no murmurs, rubs or gallops. Distal Pulses 2+  Respiratory: symmetric chest expansion, lungs CTA bilaterally with no wheezes rales or rhonci  Abdomen: soft, mildly tender, fundus firm, +BS  Genitourinary: lochia light, denies excessive vaginal bleeding  Musculoskeletal: strength 5/5 in four extremities, no calf tenderness  Neuro: no focal deficits noted       Labs:  Recent Labs      05/14/19   0530  05/14/19   2220   WBC  12.4*  22.4*   RBC  4.16*  3.12*   HEMOGLOBIN  12.4  9.9*   HEMATOCRIT  39.9  30.2*   MCV  95.9  95.5   MCH  29.8  31.1   MCHC  31.1*  32.6*   RDW  52.5*  52.1*   PLATELETCT  235  175   MPV  11.6  11.2        Activity:   Discharge to home  Pelvic  Rest x 6 weeks    Assessment:  normal postpartum course     Follow up: .  [facility] or [facility] in 5 weeks for post partum follow up  To resume daily PNV and iron supplement with hydration.     Patient to return to [facility] or ER if any of the following occur:   Fever over 100.5   Severe abdominal pain   Red streaks or painful masses in the breasts   Foul smelling discharge or lochia   Heavy vaginal bleeding saturating a pad per hour   S/s of PP depression     Discharge Meds:      Medication List      START taking these medications      Instructions   docusate sodium 100 MG Caps   Take 100 mg by mouth 2 times a day as needed.  Dose:  100 mg     ferrous sulfate 325 (65 Fe) MG tablet   Take 1 Tab by mouth every morning with breakfast.  Dose:  325 mg     ibuprofen 600 MG Tabs  Commonly known as:  MOTRIN   Take 1 Tab by mouth every 6 hours as needed for Mild Pain or Moderate Pain.  Dose:  600 mg        CONTINUE taking these medications      Instructions   PRE-ELADIO FORMULA PO   Take  by mouth.            Lizy Collins M.D.

## 2019-05-16 NOTE — DISCHARGE INSTRUCTIONS
"POSTPARTUM DISCHARGE INSTRUCTIONS FOR MOM    YOB: 1985   Age: 33 y.o.               Admit Date: 5/14/2019     Discharge Date: 5/16/2019  Attending Doctor:  Ariana Juarez M.D.                  Allergies:  Patient has no known allergies.    Discharged to home by car. Discharged via wheelchair, hospital escort: Yes.  Special equipment needed: Not Applicable  Belongings with: Personal  Be sure to schedule a follow-up appointment with your primary care doctor or any specialists as instructed.     Discharge Plan:   Influenza Vaccine Indication: Patient Refuses    REASONS TO CALL YOUR OBSTETRICIAN:  1.   Persistent fever or shaking chills (Temperature higher than 100.4)  2.   Heavy bleeding (soaking more than 1 pad per hour); Passing clots  3.   Foul odor from vagina  4.   Mastitis (Breast infection; breast pain, chills, fever, redness)  5.   Urinary pain, burning or frequency  6.   Episiotomy infection  7.   Severe depression longer than 24 hours    HAND WASHING  · Prior to handling the baby.  · Before breastfeeding or bottle feeding baby.  · After using the bathroom or changing the baby's diaper.    VAGINAL CARE  · Nothing inside vagina for 6 weeks: no sexual intercourse, tampons or douching.  · Bleeding may continue for 2-4 weeks.  Amount may vary.    · Call your physician for heavy bleeding which means soaking more than 1 pad per hour    BIRTH CONTROL  · It is possible to become pregnant at any time after delivery and while breastfeeding.  · Plan to discuss a method of birth control with your physician at your follow up visit. visit.    DIET AND ELIMINATION  · Eating more fiber (bran cereal, fruits, and vegetables) and drinking plenty of fluids will help to avoid constipation.  · Urinary frequency after childbirth is normal.    POSTPARTUM BLUES  During the first few days after birth, you may experience a sense of the \"blues\" which may include impatience, irritability or even crying.  These feeling come and " "go quickly.  However, as many as 1 in 10 women experience emotional symptoms known as postpartum depression.    Postpartum depression:  May start as early as the second or third day after delivery or take several weeks or months to develop.  Symptoms of \"blues\" are present, but are more intense:  Crying spells; loss of appetite; feelings of hopelessness or loss of control; fear of touching the baby; over concern or no concern at all about the baby; little or no concern about your own appearance/caring for yourself; and/or inability to sleep or excessive sleeping.  Contact your physician if you are experiencing any of these symptoms.    Crisis Hotline:  · [place] Crisis Hotline:  2-037-VZNZOWI  Or 1-340.729.6795  · [place] Crisis Hotline:  1-917.758.4579  Or 781-849-7865    PREVENTING SHAKEN BABY:  If you are angry or stressed, PUT THE BABY IN THE CRIB, step away, take some deep breaths, and wait until you are calm to care for the baby.  DO NOT SHAKE THE BABY.  You are not alone, call a supporter for help.    · Crisis Call Center 24/7 crisis line 394-372-5787 or 1-962.954.5849  · You can also text them, text \"ANSWER\" to 806372    QUIT SMOKING/TOBACCO USE:  I understand the use of any tobacco products increases my chance of suffering from future heart disease and could cause other illnesses which may shorten my life. Quitting the use of tobacco products is the single most important thing I can do to improve my health. For further information on smoking / tobacco cessation call a Toll Free Quit Line at 1-208.180.6190 (*National Cancer [place]) or 1-683.413.9877 (American Lung Association) or you can access the web based program at www.lungusa.org.    · [place] Tobacco Users Help Line:  (611) 747-4202       Toll Free: 1-175.612.8334  · Quit Tobacco Program [facility] (363)364-5977    DEPRESSION / SUICIDE RISK:  As you are discharged from this [facility], it is important to learn how " to keep safe from harming yourself.    Recognize the warning signs:  · Abrupt changes in personality, positive or negative- including increase in energy   · Giving away possessions  · Change in eating patterns- significant weight changes-  positive or negative  · Change in sleeping patterns- unable to sleep or sleeping all the time   · Unwillingness or inability to communicate  · Depression  · Unusual sadness, discouragement and loneliness  · Talk of wanting to die  · Neglect of personal appearance   · Rebelliousness- reckless behavior  · Withdrawal from people/activities they love  · Confusion- inability to concentrate     If you or a loved one observes any of these behaviors or has concerns about self-harm, here's what you can do:  · Talk about it- your feelings and reasons for harming yourself  · Remove any means that you might use to hurt yourself (examples: pills, rope, extension cords, firearm)  · Get professional help from the community (Mental Health, Substance Abuse, psychological counseling)  · Do not be alone:Call your Safe Contact- someone whom you trust who will be there for you.  · Call your local CRISIS HOTLINE 770-2171 or 262-169-1648  · Call your local Children's Mobile Crisis Response Team [place] (318) 616-8831 or www.Jobdoh  · Call the toll free National Suicide Prevention Hotlines   · National Suicide Prevention Lifeline 005-757-BQED (1397)  · National Hope Line Network 800-SUICIDE (253-2478)    DISCHARGE SURVEY:  Thank you for choosing [facility].  We hope we provided you with very good care.  You may be receiving a survey in the mail.  Please fill it out.  Your opinion is valuable to us.    ADDITIONAL EDUCATIONAL MATERIALS GIVEN TO PATIENT:        My signature on this form indicates that:  1.  I have reviewed and understand the above information  2.  My questions regarding this information have been answered to my satisfaction.  3.  I have formulated a plan with my discharge  nurse to obtain my prescribed medication for home.

## 2019-06-27 ENCOUNTER — HOSPITAL ENCOUNTER (OUTPATIENT)
Facility: MEDICAL CENTER | Age: 34
End: 2019-06-27
Attending: NURSE PRACTITIONER
Payer: COMMERCIAL

## 2019-06-27 ENCOUNTER — POST PARTUM (OUTPATIENT)
Dept: OBGYN | Facility: MEDICAL CENTER | Age: 34
End: 2019-06-27
Payer: COMMERCIAL

## 2019-06-27 VITALS — DIASTOLIC BLOOD PRESSURE: 70 MMHG | WEIGHT: 126 LBS | BODY MASS INDEX: 21.63 KG/M2 | SYSTOLIC BLOOD PRESSURE: 100 MMHG

## 2019-06-27 PROBLEM — Z34.80 SUPERVISION OF OTHER NORMAL PREGNANCY, ANTEPARTUM: Status: RESOLVED | Noted: 2018-11-27 | Resolved: 2019-06-27

## 2019-06-27 PROCEDURE — 99024 POSTOP FOLLOW-UP VISIT: CPT | Performed by: NURSE PRACTITIONER

## 2019-06-27 PROCEDURE — 88175 CYTOPATH C/V AUTO FLUID REDO: CPT

## 2019-06-27 PROCEDURE — 87624 HPV HI-RISK TYP POOLED RSLT: CPT

## 2019-06-27 ASSESSMENT — EDINBURGH POSTNATAL DEPRESSION SCALE (EPDS)
I HAVE BEEN ABLE TO LAUGH AND SEE THE FUNNY SIDE OF THINGS: AS MUCH AS I ALWAYS COULD
TOTAL SCORE: 0
I HAVE BEEN SO UNHAPPY THAT I HAVE HAD DIFFICULTY SLEEPING: NOT AT ALL
I HAVE BEEN SO UNHAPPY THAT I HAVE BEEN CRYING: NO, NEVER
I HAVE FELT SCARED OR PANICKY FOR NO GOOD REASON: NO, NOT AT ALL
I HAVE BEEN ANXIOUS OR WORRIED FOR NO GOOD REASON: NO, NOT AT ALL
THINGS HAVE BEEN GETTING ON TOP OF ME: NO, I HAVE BEEN COPING AS WELL AS EVER
I HAVE LOOKED FORWARD WITH ENJOYMENT TO THINGS: AS MUCH AS I EVER DID
I HAVE BLAMED MYSELF UNNECESSARILY WHEN THINGS WENT WRONG: NO, NEVER
I HAVE FELT SAD OR MISERABLE: NO, NOT AT ALL
THE THOUGHT OF HARMING MYSELF HAS OCCURRED TO ME: NEVER

## 2019-06-27 ASSESSMENT — ENCOUNTER SYMPTOMS
CONSTITUTIONAL NEGATIVE: 1
RESPIRATORY NEGATIVE: 1
CARDIOVASCULAR NEGATIVE: 1
PSYCHIATRIC NEGATIVE: 1
NEUROLOGICAL NEGATIVE: 1
GASTROINTESTINAL NEGATIVE: 1
EYES NEGATIVE: 1
MUSCULOSKELETAL NEGATIVE: 1

## 2019-06-27 NOTE — PROGRESS NOTES
Pt here today for postpartum exam.  Delivery Date:05/14/2019  Currently: Bottle feeding   BCM: declined, [facility]   Good ph:[phone]  Pt states no concerns

## 2019-06-27 NOTE — PROGRESS NOTES
Subjective:      Monalisa Mcknight is a 33 y.o.  female who presents for her postpartum exam. She had a  without complication. Her prenatal course was uncomplicated. She denies dysuria, vaginal bleeding, odor, itching or breast problems. She is bottle feeding.  She desires an only condom for her birth control method. Reports no sex prior to this appointment.  Denies any S/S of PP depression.    Assessment   Assessment:    1. PP care of lactating women   2. Exam WNL   3. Pap today  4. Desires no contraception     Patient Active Problem List    Diagnosis Date Noted   • Encounter for postpartum care of non-lactating mother 2019       Plan   Plan:    1. Breastfeeding support   2. Continue PNV   3. Contraceptive counseling - follow up w [facility] id [facility] desired  4. Encouraged condom use   5. Discussed diet, exercise and resumption of sexual activity   6. Gave copy of pap   7.  F/u c PCP or [facility] clinic as needed for primary care needs.   HPI    Review of Systems   Constitutional: Negative.    HENT: Negative.    Eyes: Negative.    Respiratory: Negative.    Cardiovascular: Negative.    Gastrointestinal: Negative.    Genitourinary: Negative.    Musculoskeletal: Negative.    Skin: Negative.    Neurological: Negative.    Endo/Heme/Allergies: Negative.    Psychiatric/Behavioral: Negative.           Objective:     /70   Wt 57.2 kg (126 lb)   LMP 2018   BMI 21.63 kg/m²      Physical Exam   Constitutional: She is oriented to person, place, and time. She appears well-developed and well-nourished.   Pulmonary/Chest: Effort normal.   Abdominal: Soft.   Genitourinary: Vagina normal and uterus normal. Rectal exam shows no tenderness. Pelvic exam was performed with patient supine. No labial fusion. There is no rash, tenderness, lesion or injury on the right labia. There is no rash, tenderness, lesion or injury on the left labia. No erythema, tenderness or bleeding in the vagina. No signs of injury around the  vagina. No vaginal discharge found.   Neurological: She is alert and oriented to person, place, and time.   Skin: Skin is warm and dry.   Psychiatric: She has a normal mood and affect. Her behavior is normal. Judgment and thought content normal.               Assessment/Plan:     1. Encounter for postpartum care of non-lactating mother    - THINPREP PAP WITH HPV; Future

## 2019-06-28 LAB
CYTOLOGY REG CYTOL: NORMAL
HPV HR 12 DNA CVX QL NAA+PROBE: NEGATIVE
HPV16 DNA SPEC QL NAA+PROBE: NEGATIVE
HPV18 DNA SPEC QL NAA+PROBE: NEGATIVE
SPECIMEN SOURCE: NORMAL

## 2022-12-10 ENCOUNTER — OFFICE VISIT (OUTPATIENT)
Dept: URGENT CARE | Facility: CLINIC | Age: 37
End: 2022-12-10

## 2022-12-10 VITALS
HEART RATE: 72 BPM | RESPIRATION RATE: 14 BRPM | SYSTOLIC BLOOD PRESSURE: 102 MMHG | BODY MASS INDEX: 18.78 KG/M2 | WEIGHT: 110 LBS | TEMPERATURE: 97.1 F | OXYGEN SATURATION: 95 % | DIASTOLIC BLOOD PRESSURE: 56 MMHG | HEIGHT: 64 IN

## 2022-12-10 DIAGNOSIS — J20.9 ACUTE BRONCHITIS WITH BRONCHOSPASM: ICD-10-CM

## 2022-12-10 DIAGNOSIS — H10.029 PINK EYE DISEASE, UNSPECIFIED LATERALITY: ICD-10-CM

## 2022-12-10 PROCEDURE — 99203 OFFICE O/P NEW LOW 30 MIN: CPT | Performed by: FAMILY MEDICINE

## 2022-12-10 RX ORDER — ALBUTEROL SULFATE 90 UG/1
2 AEROSOL, METERED RESPIRATORY (INHALATION) EVERY 6 HOURS PRN
Qty: 8.5 G | Refills: 3 | Status: SHIPPED | OUTPATIENT
Start: 2022-12-10 | End: 2023-10-11

## 2022-12-10 RX ORDER — PREDNISONE 20 MG/1
40 TABLET ORAL EVERY MORNING
Qty: 10 TABLET | Refills: 0 | Status: SHIPPED | OUTPATIENT
Start: 2022-12-10 | End: 2022-12-15

## 2022-12-10 RX ORDER — POLYMYXIN B SULFATE AND TRIMETHOPRIM 1; 10000 MG/ML; [USP'U]/ML
1 SOLUTION OPHTHALMIC 4 TIMES DAILY
Qty: 10 ML | Refills: 0 | Status: SHIPPED | OUTPATIENT
Start: 2022-12-10 | End: 2022-12-15

## 2022-12-10 RX ORDER — DOXYCYCLINE HYCLATE 100 MG
100 TABLET ORAL EVERY 12 HOURS
Qty: 14 TABLET | Refills: 0 | Status: SHIPPED | OUTPATIENT
Start: 2022-12-10 | End: 2022-12-17

## 2022-12-10 RX ORDER — DEXTROMETHORPHAN HYDROBROMIDE AND PROMETHAZINE HYDROCHLORIDE 15; 6.25 MG/5ML; MG/5ML
5 SYRUP ORAL EVERY 6 HOURS PRN
Qty: 120 ML | Refills: 0 | Status: SHIPPED | OUTPATIENT
Start: 2022-12-10 | End: 2023-10-11

## 2022-12-10 ASSESSMENT — ENCOUNTER SYMPTOMS: COUGH: 1

## 2022-12-10 NOTE — PROGRESS NOTES
"Subjective     Monalisa Mcknight is a 36 y.o. female who presents with Cough (Feels like it in chest X 1 month )      - This is a pleasant and nontoxic appearing 36 y.o. female who has come to the walk-in clinic today for:    #1) 4wks ago w/ cold (cough/sinus). Has been improving but in past week dry cough started up again and worse at night or trying to deep breath. No associated NVFC. Also eyes a little red draining x 2-3 days, no trauma vision change or use contacts       ALLERGIES:  Hydrocodone-acetaminophen     PMH:  History reviewed. No pertinent past medical history.     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    OTHER ABDOMINAL SURGERY      hernia repair       MEDS:    Current Outpatient Medications:     promethazine-dextromethorphan (PROMETHAZINE-DM) 6.25-15 MG/5ML syrup, Take 5 mL by mouth every 6 hours as needed for Cough., Disp: 120 mL, Rfl: 0    doxycycline (VIBRAMYCIN) 100 MG Tab, Take 1 Tablet by mouth every 12 hours for 7 days., Disp: 14 Tablet, Rfl: 0    albuterol 108 (90 Base) MCG/ACT Aero Soln inhalation aerosol, Inhale 2 Puffs every 6 hours as needed for Shortness of Breath., Disp: 8.5 g, Rfl: 3    predniSONE (DELTASONE) 20 MG Tab, Take 2 Tablets by mouth every morning for 5 days., Disp: 10 Tablet, Rfl: 0    ibuprofen (MOTRIN) 600 MG Tab, Take 1 Tab by mouth every 6 hours as needed for Mild Pain or Moderate Pain., Disp: 60 Tab, Rfl: 0    ** I have documented what I find to be significant in regards to past medical, social, family and surgical history  in my HPI or under PMH/PSH/FH review section, otherwise it is noncontributory **         HPI    Review of Systems   Respiratory:  Positive for cough.    All other systems reviewed and are negative.           Objective     /56 (BP Location: Right arm, Patient Position: Sitting, BP Cuff Size: Adult)   Pulse 72   Temp 36.2 °C (97.1 °F) (Temporal)   Resp 14   Ht 1.626 m (5' 4\")   Wt 49.9 " kg (110 lb)   SpO2 95%   BMI 18.88 kg/m²      Physical Exam  Vitals and nursing note reviewed.   Constitutional:       General: She is not in acute distress.     Appearance: Normal appearance. She is well-developed.   HENT:      Head: Normocephalic.      Mouth/Throat:      Mouth: Mucous membranes are moist.      Pharynx: Oropharynx is clear.   Eyes:      Comments: Eyes: mild injection w/ clear dc    Cardiovascular:      Heart sounds: Normal heart sounds. No murmur heard.  Pulmonary:      Effort: Pulmonary effort is normal. No respiratory distress.      Breath sounds: Wheezing (mild end exp) present. No rhonchi or rales.   Neurological:      Mental Status: She is alert.      Motor: No abnormal muscle tone.   Psychiatric:         Mood and Affect: Mood normal.         Behavior: Behavior normal.                           Assessment & Plan       1. Acute bronchitis with bronchospasm  promethazine-dextromethorphan (PROMETHAZINE-DM) 6.25-15 MG/5ML syrup    doxycycline (VIBRAMYCIN) 100 MG Tab    albuterol 108 (90 Base) MCG/ACT Aero Soln inhalation aerosol    predniSONE (DELTASONE) 20 MG Tab          - Dx, plan & d/c instructions discussed   - Rest, stay hydrated      Follow up with your regular primary care providers office for a recheck on today's visit. ER if not improving in 2-3 days or if feeling/getting worse.     Any realistic side effects of medications that may have been given today reviewed.     Patient left in stable condition

## 2023-01-23 ENCOUNTER — OFFICE VISIT (OUTPATIENT)
Dept: URGENT CARE | Facility: CLINIC | Age: 38
End: 2023-01-23

## 2023-01-23 VITALS
OXYGEN SATURATION: 98 % | TEMPERATURE: 97 F | RESPIRATION RATE: 14 BRPM | BODY MASS INDEX: 22.2 KG/M2 | HEART RATE: 84 BPM | HEIGHT: 64 IN | WEIGHT: 130 LBS

## 2023-01-23 DIAGNOSIS — R09.89 CHEST CONGESTION: ICD-10-CM

## 2023-01-23 DIAGNOSIS — H66.001 NON-RECURRENT ACUTE SUPPURATIVE OTITIS MEDIA OF RIGHT EAR WITHOUT SPONTANEOUS RUPTURE OF TYMPANIC MEMBRANE: ICD-10-CM

## 2023-01-23 DIAGNOSIS — R05.1 ACUTE COUGH: ICD-10-CM

## 2023-01-23 DIAGNOSIS — J01.00 ACUTE NON-RECURRENT MAXILLARY SINUSITIS: ICD-10-CM

## 2023-01-23 PROCEDURE — 99213 OFFICE O/P EST LOW 20 MIN: CPT | Performed by: PHYSICIAN ASSISTANT

## 2023-01-23 RX ORDER — GUAIFENESIN 600 MG/1
600 TABLET, EXTENDED RELEASE ORAL EVERY 12 HOURS
Qty: 28 TABLET | Refills: 0 | Status: SHIPPED | OUTPATIENT
Start: 2023-01-23 | End: 2023-02-06

## 2023-01-23 RX ORDER — BENZONATATE 100 MG/1
100 CAPSULE ORAL 3 TIMES DAILY PRN
Qty: 20 CAPSULE | Refills: 0 | Status: SHIPPED | OUTPATIENT
Start: 2023-01-23 | End: 2023-10-11

## 2023-01-23 RX ORDER — PREDNISONE 20 MG/1
TABLET ORAL
Qty: 10 TABLET | Refills: 0 | Status: SHIPPED | OUTPATIENT
Start: 2023-01-23 | End: 2023-10-11

## 2023-01-23 RX ORDER — AMOXICILLIN AND CLAVULANATE POTASSIUM 875; 125 MG/1; MG/1
1 TABLET, FILM COATED ORAL 2 TIMES DAILY
Qty: 14 TABLET | Refills: 0 | Status: SHIPPED | OUTPATIENT
Start: 2023-01-23 | End: 2023-01-30

## 2023-01-23 ASSESSMENT — FIBROSIS 4 INDEX: FIB4 SCORE: 0.77

## 2023-01-23 ASSESSMENT — ENCOUNTER SYMPTOMS: COUGH: 1

## 2023-01-23 NOTE — PROGRESS NOTES
"Subjective:   Monalisa Mcknight is a 37 y.o. female who presents for Cough (X3 days Cough, sore throat, SOB R ear pain, congestion, had bronchitis back in December got better then started up again)     Patient presents with her child with chief complaint of 3-day history of severe cough, near wheezing, right ear pain, sinus congestion, ear congestion.  She reports her cough is productive of phlegm.  She reports associated fevers and chills.  T-max 101.  Does have a history of pneumonia, bronchitis.  She does report thick yellow postnasal drainage.  Cough productice of phlegm.  She reports severe right ear pain.  No COVID concerns  Child being seen for similar  Tried cough syrup, no relief, cough drops, excedrin,dayquil          Review of Systems   Respiratory:  Positive for cough.      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 She has never used smokeless tobacco. She reports that she does not drink alcohol and does not use drugs.     Past Family Hx:   Monalisa Mcknight family history is not on file.       Problem list, medications, and allergies reviewed by myself today in Epic.     Objective:     Pulse 84   Temp 36.1 °C (97 °F) (Temporal)   Resp 14   Ht 1.626 m (5' 4\")   Wt 59 kg (130 lb)   SpO2 98%   BMI 22.31 kg/m²     Physical Exam  Vitals and nursing note reviewed.   Constitutional:       General: She is not in acute distress.     Appearance: Normal appearance. She is well-developed and well-groomed. She is not ill-appearing, toxic-appearing or diaphoretic.   HENT:      Head: Normocephalic and atraumatic.      Right Ear: Hearing, ear canal and external ear normal. No decreased hearing noted. " No drainage, swelling or tenderness. No foreign body. Tympanic membrane is erythematous and bulging.      Left Ear: Hearing, tympanic membrane, ear canal and external ear normal. No decreased hearing noted. No drainage, swelling or tenderness. No foreign body.      Ears:      Comments: Erythematous bulging TM on right     Nose: Mucosal edema, congestion and rhinorrhea present.      Mouth/Throat:      Mouth: Mucous membranes are moist.      Pharynx: Uvula midline. Pharyngeal swelling and posterior oropharyngeal erythema present. No oropharyngeal exudate or uvula swelling.      Tonsils: No tonsillar exudate or tonsillar abscesses.   Eyes:      General:         Right eye: No discharge.         Left eye: No discharge.      Conjunctiva/sclera: Conjunctivae normal.      Pupils: Pupils are equal, round, and reactive to light.   Cardiovascular:      Rate and Rhythm: Normal rate and regular rhythm.      Pulses: Normal pulses.      Heart sounds: Normal heart sounds. No murmur heard.    No friction rub.   Pulmonary:      Effort: Pulmonary effort is normal. No tachypnea, accessory muscle usage, prolonged expiration or respiratory distress.      Breath sounds: Normal breath sounds. No stridor or decreased air movement. No decreased breath sounds, wheezing, rhonchi or rales.      Comments: Lungs clear to auscultation bilaterally, no rhonchi rales or wheezes.  Abdominal:      Palpations: Abdomen is soft.   Musculoskeletal:         General: Normal range of motion.      Cervical back: Normal range of motion and neck supple. No rigidity.      Right lower leg: No edema.      Left lower leg: No edema.   Lymphadenopathy:      Cervical: No cervical adenopathy.   Skin:     General: Skin is warm and dry.   Neurological:      Mental Status: She is alert and oriented to person, place, and time.   Psychiatric:         Behavior: Behavior is cooperative.       Assessment/Plan:     Diagnosis and Associated Orders:     1. Non-recurrent acute  suppurative otitis media of right ear without spontaneous rupture of tympanic membrane  - amoxicillin-clavulanate (AUGMENTIN) 875-125 MG Tab; Take 1 Tablet by mouth 2 times a day for 7 days.  Dispense: 14 Tablet; Refill: 0    2. Acute cough  - benzonatate (TESSALON) 100 MG Cap; Take 1 Capsule by mouth 3 times a day as needed for Cough.  Dispense: 20 Capsule; Refill: 0    3. Chest congestion  - guaiFENesin ER (MUCINEX) 600 MG TABLET SR 12 HR; Take 1 Tablet by mouth every 12 hours for 14 days.  Dispense: 28 Tablet; Refill: 0    4. Acute non-recurrent maxillary sinusitis  - amoxicillin-clavulanate (AUGMENTIN) 875-125 MG Tab; Take 1 Tablet by mouth 2 times a day for 7 days.  Dispense: 14 Tablet; Refill: 0  - predniSONE (DELTASONE) 20 MG Tab; Tab 2 po qd x 5 days  Dispense: 10 Tablet; Refill: 0        Comments/MDM:    Patient presents with bacterial otitis media and bacterial sinus infection requiring antibiotic treatment.  Recommend prednisone if symptoms do not remit over the next 48 hours.  Prescription provided.  Return precautions discussed at length.  Vital signs stable and reassuring.  -Increase water intake  -May use over the counter Ibuprofen/Tylenol as needed for any fever, body aches or throat pain  -May take long acting antihistamine for seasonal allergy symptoms and post-nasal drip as needed  -Over the counter cough suppressant as directed.  -May use over the counter saline nasal spray for nasal lavage for nasal congestion as needed  -May use over the counter Nasacort/Flonase for nasal congestion as needed   -May use throat lozenges for throat discomfort as needed   -May gargle with salt water up to 4x/day as needed for throat discomfort (1 tsp salt dissolved in 1 cup warm water)  -Monitor for increased sinus pain/pressure with sinus congestion with thick mucus production, sinus headache, cough, shortness of breath, fever- need re-evaluation      I personally reviewed prior external notes and test results  pertinent to today's visit.  Red flags discussed as well as indications to present to the Emergency Department.  Supportive care, natural history, differential diagnoses, and indications for immediate follow-up discussed.  Patient expresses understanding and agrees to plan.  Patient denies any other questions or concerns.    Follow-up with the primary care physician for recheck, reevaluation, and consideration of further management.      Please note that this dictation was created using voice recognition software. I have made a reasonable attempt to correct obvious errors, but I expect that there are errors of grammar and possibly content that I did not discover before finalizing the note.    This note was electronically signed by Naty Levy PA-C

## 2023-09-08 ENCOUNTER — TELEPHONE (OUTPATIENT)
Dept: CARDIOLOGY | Facility: MEDICAL CENTER | Age: 38
End: 2023-09-08

## 2023-09-08 NOTE — TELEPHONE ENCOUNTER
Patient was seen at [facility]     Requested medical records    Phone Number : [phone]  Fax Number : [phone]    Fax Confirmation received and scan in to [facility]

## 2023-09-08 NOTE — TELEPHONE ENCOUNTER
Spoke to patient in regards to records for NP appointment with rowan.     Patient has seen a cardiologist before?  Yes   If yes, where?: [facility]    Any recent cardiac testing outside of [facility]?  Yes   What testing: EKG, ECHO, Blood work   Where was it completed?:    Were any records requested?  No   Fax:

## 2023-09-08 NOTE — TELEPHONE ENCOUNTER
Spoke to patient in regards to records for NP appointment with rowan.      Patient has seen a cardiologist before?  Yes              If yes, where?: [facility]     Any recent cardiac testing outside of [facility]?  Yes              What testing: EKG, ECHO, Blood work, monitor, stress test              Where was it completed?:     Were any records requested?  No              Fax:

## 2023-10-10 NOTE — PROGRESS NOTES
CARDIOLOGY CONSULTATION NOTE      Date of Visit: 10/11/2023    Primary Care Provider: Pcp Pt States None  Referring Provider: No ref. provider found    Patient Name: Monalisa Mcknight    YOB: 1985  MRN: [id number]     Reason for Visit:   Tachycardia, syncope     Patient Story:   Monalisa Mcknight is a 37 year-old woman with a past medical history of mild to moderate mitral regurgitation.  Briefly, she during the her most recent pregnancy in 2022 she began to experience episodes of near syncope.  Her symptoms were attributed to increased vagal tone during pregnancy.  She did see Dr. Castañeda at [facility] for evaluation and had a two-week cardiac monitor performed, which showed only rare PACs and PVCs with brief episodes of PSVT, and an echocardiogram, which showed mild to moderate mitral regurgitation.    She presents today for consultation due to ongoing symptoms.  She notes that over the past few months she has had increasing burden of symptoms.  Her main symptom is a sensation of a racing heart rate that we will start suddenly and last for several minutes.  This will often occur at rest and is not specifically brought on by exertion or positional changes.  She had one episode of silvestre syncope during dinner.  At that time she felt very hot/flushed with a racing heart rate and then began to lose her vision prior to fully passing out.  She is also noticed that her exertional tolerance has been worsening and she gets tired quickly with activity.  She will sometimes have chest discomfort when her heart is racing.  She has not noticed new lower extremity edema.     Medications and Allergies:     No current outpatient medications on file.     No current facility-administered medications for this visit.     Allergies   Allergen Reactions    Hydrocodone-Acetaminophen Vomiting      Medical Decision Making:   # Tachycardia, syncope: Her previous cardiac monitor did not demonstrate a clear etiology for her symptoms.   "However, as she has been having increasing burden of symptoms will obtain a repeat cardiac monitor for longer duration.  -Obtain 30-day cardiac monitor    # Mild-moderate mitral regurgitation: Given worsening symptoms, will also obtain a repeat echocardiogram.  -Obtain echocardiogram    Follow Up  6 weeks     Cardiac Studies and Procedures:   Echocardiography  Limited TTE (7/18/2022)  The Ejection Fraction estimate is 55-60%   No regional wall motion abnormalities noted.   There is mild to moderate mitral regurgitation     TTE (5/20/2022)  Left ventricular systolic function is normal.  The Ejection Fraction estimate is 65-70%  There is mild to moderate mitral regurgitation    Stress Testing  ETT TTE (7/18/2022)  Left ventricular systolic function is normal.   There was normal augmentation of all left ventricular wall segments post-exercise   Good exercise tolerance (METs: 13.40).     Electrophysiology  Cardiac Monitor (5/2022)  <1% PAC/PVC, rare SVT up to 5 beats, no VT  No pauses > 3 seconds  No AF detected  2 patient events: NSR     Vital Signs:   /68 (BP Location: Left arm, Patient Position: Sitting)   Pulse 90   Resp 16   Ht 1.626 m (5' 4\")   Wt 56.2 kg (124 lb)   SpO2 98%    BP Readings from Last 4 Encounters:   10/11/23 100/68   12/10/22 102/56   06/27/19 100/70   05/16/19 103/60     Wt Readings from Last 4 Encounters:   10/11/23 56.2 kg (124 lb)   01/23/23 59 kg (130 lb)   12/10/22 49.9 kg (110 lb)   06/27/19 57.2 kg (126 lb)     Body mass index is 21.28 kg/m².     Laboratories:   Lipids  No results found for: \"LDL\"    No results found for: \"HDL\"    No results found for: \"TRIGLYCERIDE\"    No results found for: \"CHOLSTRLTOT\"    No components found for: \"LPA\"      Chemistries  Lab Results   Component Value Date/Time    CREATININE 0.57 (L) 05/20/2022 01:55 PM    CREATININE 0.72 05/20/2022 09:42 AM     Lab Results   Component Value Date/Time    BUN 6 (L) 05/20/2022 01:55 PM    BUN 8 05/20/2022 09:42 AM " "    Lab Results   Component Value Date/Time    POTASSIUM 3.7 05/20/2022 01:55 PM    POTASSIUM 4.0 05/20/2022 09:42 AM     Lab Results   Component Value Date/Time    SODIUM 135 (L) 05/20/2022 01:55 PM    SODIUM 135 (L) 05/20/2022 09:42 AM     Lab Results   Component Value Date/Time    GLUCOSE 99 05/20/2022 01:55 PM    GLUCOSE 107 (H) 05/20/2022 09:42 AM     Lab Results   Component Value Date/Time    ASTSGOT 13 (L) 05/20/2022 01:55 PM    ASTSGOT 14 (L) 05/20/2022 09:42 AM     Lab Results   Component Value Date/Time    ALTSGPT 6 (L) 05/20/2022 01:55 PM    ALTSGPT 6 (L) 05/20/2022 09:42 AM     Lab Results   Component Value Date/Time    ALKPHOSPHAT 166 (H) 05/20/2022 01:55 PM    ALKPHOSPHAT 186 (H) 05/20/2022 09:42 AM     No results found for: \"HBA1C\"  No results found for: \"TSH\"  No results found for: \"NTPROBNP\"  No results found for: \"TROPONINT\"    Blood Counts  Lab Results   Component Value Date/Time    HEMOGLOBIN 10.1 (L) 05/20/2022 09:42 AM    HEMOGLOBIN 9.9 (L) 05/14/2019 10:20 PM    HEMOGLOBIN 12.4 05/14/2019 05:30 AM    HEMOGLOBIN 11.7 (L) 02/21/2019 09:59 AM     Lab Results   Component Value Date/Time    PLATELETCT 249 05/20/2022 09:42 AM    PLATELETCT 175 05/14/2019 10:20 PM    PLATELETCT 235 05/14/2019 05:30 AM    PLATELETCT 255 02/21/2019 09:59 AM     Lab Results   Component Value Date/Time    WBC 22.4 (H) 05/14/2019 10:20 PM    WBC 12.4 (H) 05/14/2019 05:30 AM    WBC 15.7 (H) 02/21/2019 09:59 AM        Physical Examination:   General: Well-appearing, no acute distress  Eyes: Extraocular movements intact, anicteric  Ears: No ear lobe crease  Neck: Full range of motion, no jugular venous distension  Pulmonary: Normal respiratory effort, no distress  Cardiovascular: Regular rate and rhythm, no murmurs or gallops appreciated  Gastrointestinal: Thin, nondistended  Extremities: Warm and well perfused, no lower extremity edema  Neurological: Alert and oriented, no gross focal motor deficits  Psychiatric: Normal " affect, normal judgment     Past History:   Past Medical History  The patient's past medical history was reviewed.  See HPI and self-reported patient medical history form for pertinent medical history to consultation.    Past Social History  The patient's social history was reviewed.  See HPI self-reported patient medical history form for pertinent social history to consultation.    Past Family History  The patient's family history was reviewed.  See HPI self-reported patient medical history form for pertinent family history to consultation.    Review of Systems  A pertinent cardiac review of systems was performed and was otherwise unremarkable except as per HPI and self-reported patient medical history form.        Rigo Tong MD, [facility]  Interventional Cardiology  [facility], [address]  [address] [address]  Phone: [phone]  Fax: [phone]

## 2023-10-11 ENCOUNTER — OFFICE VISIT (OUTPATIENT)
Dept: CARDIOLOGY | Facility: MEDICAL CENTER | Age: 38
End: 2023-10-11
Attending: INTERNAL MEDICINE

## 2023-10-11 VITALS
RESPIRATION RATE: 16 BRPM | DIASTOLIC BLOOD PRESSURE: 68 MMHG | WEIGHT: 124 LBS | HEIGHT: 64 IN | BODY MASS INDEX: 21.17 KG/M2 | OXYGEN SATURATION: 98 % | SYSTOLIC BLOOD PRESSURE: 100 MMHG | HEART RATE: 90 BPM

## 2023-10-11 DIAGNOSIS — I34.0 NONRHEUMATIC MITRAL VALVE REGURGITATION: ICD-10-CM

## 2023-10-11 DIAGNOSIS — R00.0 TACHYCARDIA: ICD-10-CM

## 2023-10-11 DIAGNOSIS — R55 SYNCOPE AND COLLAPSE: ICD-10-CM

## 2023-10-11 LAB — EKG IMPRESSION: NORMAL

## 2023-10-11 PROCEDURE — 3078F DIAST BP <80 MM HG: CPT | Performed by: INTERNAL MEDICINE

## 2023-10-11 PROCEDURE — 99211 OFF/OP EST MAY X REQ PHY/QHP: CPT | Performed by: INTERNAL MEDICINE

## 2023-10-11 PROCEDURE — 93010 ELECTROCARDIOGRAM REPORT: CPT | Performed by: INTERNAL MEDICINE

## 2023-10-11 PROCEDURE — 93005 ELECTROCARDIOGRAM TRACING: CPT | Performed by: INTERNAL MEDICINE

## 2023-10-11 PROCEDURE — 3074F SYST BP LT 130 MM HG: CPT | Performed by: INTERNAL MEDICINE

## 2023-10-11 PROCEDURE — 99204 OFFICE O/P NEW MOD 45 MIN: CPT | Performed by: INTERNAL MEDICINE

## 2023-10-11 ASSESSMENT — FIBROSIS 4 INDEX: FIB4 SCORE: 0.77

## 2023-10-18 ENCOUNTER — NON-PROVIDER VISIT (OUTPATIENT)
Dept: CARDIOLOGY | Facility: MEDICAL CENTER | Age: 38
End: 2023-10-18
Attending: INTERNAL MEDICINE

## 2023-10-18 DIAGNOSIS — R00.0 SINUS TACHYCARDIA: ICD-10-CM

## 2023-10-18 DIAGNOSIS — R55 SYNCOPE AND COLLAPSE: ICD-10-CM

## 2023-10-18 PROCEDURE — 93270 REMOTE 30 DAY ECG REV/REPORT: CPT

## 2023-11-06 ENCOUNTER — TELEPHONE (OUTPATIENT)
Dept: CARDIOLOGY | Facility: MEDICAL CENTER | Age: 38
End: 2023-11-06

## 2023-11-06 DIAGNOSIS — R55 SYNCOPE AND COLLAPSE: ICD-10-CM

## 2023-11-06 NOTE — TELEPHONE ENCOUNTER
YIMI     Caller: Kevin at [employer]     Topic/issue: Reporting Urgent EKG requesting a call back     Callback Number: [phone]    Thank You   Lashell WORKMAN

## 2023-11-06 NOTE — TELEPHONE ENCOUNTER
----- Message from Rigo Tong M.D. sent at 11/6/2023  1:56 PM PST -----  Can you call the patient and confirm if she had a fall or a syncopal episode.  She did not have any significant arrhythmias at that time, so she had a true syncopal episode she may need a Neurology referral.

## 2023-11-06 NOTE — TELEPHONE ENCOUNTER
"To BN, pt felt lightheaded, dizzy, and had a syncopal episode for a quick moment per pt.  She states she is ok with neurology referral.  I told her I would let he know before placing it.  Please advise, thank you!    ======================    Called pt, [phone] to review findings.  Monalisa states the even that was recorded she \"felt lightheaded, dizzy, sat down then fainted.  It was so quick came to and was still feeling light headed and dizzy.\"  Reviewed result note from BN.  She states she would like to proceed with referral.  She verbalizes understanding and states no other concerns or questions at this time.  Monalisa is appreciative of information given.    "

## 2023-11-06 NOTE — TELEPHONE ENCOUNTER
YIMI    Caller: Kevin with [employer]    Date and time of urgent report: EKG-   11/05/2023 6:07 pm    Reference Number: N/A    Callback Number: [phone]    Route to Yunior BUSTAMANTE     Thank You  Bertha KOWALSKI

## 2023-11-07 NOTE — TELEPHONE ENCOUNTER
Other (Urgent EKG)   Rigo Tong M.D.  You 23 hours ago (4:36 PM)     Yes, please place. If she is having episodes with no correlation with arrhythmias she should see Neurology.      Referral placed.    MyChart sent regarding findings.

## 2023-11-10 ENCOUNTER — TELEPHONE (OUTPATIENT)
Dept: HEALTH INFORMATION MANAGEMENT | Facility: OTHER | Age: 38
End: 2023-11-10

## 2023-11-20 ENCOUNTER — TELEPHONE (OUTPATIENT)
Dept: CARDIOLOGY | Facility: MEDICAL CENTER | Age: 38
End: 2023-11-20

## 2023-11-20 ENCOUNTER — TELEPHONE (OUTPATIENT)
Dept: HEALTH INFORMATION MANAGEMENT | Facility: OTHER | Age: 38
End: 2023-11-20

## 2023-11-20 PROCEDURE — 93228 REMOTE 30 DAY ECG REV/REPORT: CPT | Performed by: INTERNAL MEDICINE

## 2023-11-21 NOTE — TELEPHONE ENCOUNTER
Can you let her know that her heart monitor did not show any clearly significant abnormalities.  She should notify our office if she is continuing to have very significant symptoms.

## 2023-11-22 NOTE — TELEPHONE ENCOUNTER
Upon chart review, pt is active on "[employer]. Last login 11/20/23.    [employer] message sent to pt regarding BN recommendations.

## 2024-01-02 ENCOUNTER — ANCILLARY PROCEDURE (OUTPATIENT)
Dept: CARDIOLOGY | Facility: MEDICAL CENTER | Age: 39
End: 2024-01-02
Attending: INTERNAL MEDICINE
Payer: COMMERCIAL

## 2024-01-02 DIAGNOSIS — R55 SYNCOPE AND COLLAPSE: ICD-10-CM

## 2024-01-02 LAB
LV EJECT FRACT  99904: 60
LV EJECT FRACT MOD 2C 99903: 62.41
LV EJECT FRACT MOD 4C 99902: 55.56
LV EJECT FRACT MOD BP 99901: 61.1

## 2024-01-02 PROCEDURE — 93306 TTE W/DOPPLER COMPLETE: CPT | Mod: 26 | Performed by: INTERNAL MEDICINE

## 2024-01-02 PROCEDURE — 93306 TTE W/DOPPLER COMPLETE: CPT

## 2024-01-05 NOTE — PROGRESS NOTES
"[facility]  New patient visit    Patient name: Monalisa Mcknight  YOB: 1985  MRN: [id number]  Referring provider: Rigo Tong M.D.  [address] [place] [address]  [place] [address]   Date of visit: 1/5/2024     CC: Seizure      HPI:    Monalisa Mcknight is a 38 y.o. woman with minimal PMH who is referred for an initial neurologic consultation for episodes of syncope and collapse.    The patient is unaccompanied to the visit and provides a history independently.    The events started at the end of pregnancy in 2022.  The events became more frequent after she delivered her child.  At that time, she was having episodes of shortness of breath even at rest and episodes of passing out without exertion.  She underwent a cardiac workup and was found to have mitral valve insufficiency with regurgitation.  She underwent cardiac monitoring, the most recent of which was in November 2023 which captured 1 event involving \"fainting and falling\" during light activity at which time monitor exhibited normal sinus rhythm with a rate of 89 bpm.  She also had a brief episode of shortness of breath with heart racing, with corresponding sinus tachycardia at a rate of 163 bpm.    The events in question involves starting to feel hot,  describes that her face turns pale and loses coloration, sees static in vision followed by blackout and collapse.  No body movements or jerking noted during times of blackout spells.  There is an immediate return to baseline after the event.  She feels tired after 1 such event.  There is no urinary or bowel incontinence, no tongue biting.    Onset: 2022  Semiology: 1) Stereotyped events - starts getting hot, heart starts racing for over 1 minute --> world goes black followed by collapsing. Unconscious for < 1 minute. No post-event confusion. No  convulsions or body movements noted at the time of loss of consciousness. Feels tired afterwards. With some events she will fall " to the ground for a short period of time ~15 minutes. She has enough time to   Frequency: Approximately 1x per month.   Duration of spells: 2 minutes  Triggers: none identified  Current treatment: none    Last event was slightly before [place].     History reviewed.  She has a history of benign breast tumor status post removal.    Mood: No history of mood issues      Driving: yes      Risk factors for epileptic seizures:  Normal birth history, no complications, born at term.   No history of significant head trauma.   No history of stroke or meningitis.  No family history of epilepsy.   No febrile seizures.         She is currently employed.  Works in real estate management.  She does not smoke cigarettes, drink alcohol, or use drugs.    Past Medical History: No past medical history on file.    Past Surgical History:   Past Surgical History:   Procedure Laterality Date    OTHER      tumor removal from R breast    OTHER ABDOMINAL SURGERY      hernia repair       Social History:   Social History     Socioeconomic History    Marital status:      Spouse name: Not on file    Number of children: Not on file    Years of education: Not on file    Highest education level: Not on file   Occupational History    Not on file   Tobacco Use    Smoking status: Never    Smokeless tobacco: Never   Substance and Sexual Activity    Alcohol use: No    Drug use: No    Sexual activity: Yes     Partners: Male   Other Topics Concern    Not on file   Social History Narrative    Not on file     Social Determinants of Health     Financial Resource Strain: Not on file   Food Insecurity: Not on file   Transportation Needs: Not on file   Physical Activity: Not on file   Stress: Not on file   Social Connections: Not on file   Intimate Partner Violence: Not on file   Housing Stability: Not on file       Family Hx: History reviewed. No pertinent family history.    Current Medications: No current outpatient medications on file.    Allergies:    Allergies   Allergen Reactions    Hydrocodone-Acetaminophen Vomiting         Physical Exam:   Ambulatory Vitals  Vitals:    01/08/24 0811   BP: 110/64   Pulse: 68   Resp: 16   Temp: 36.1 °C (96.9 °F)   SpO2: 98%       Constitutional: Well-developed, well-nourished, good hygiene. Appears stated age.  Cardiovascular: RRR  Respiratory: Normal respiratory effort  Skin: Warm, dry, intact. No rashes observed.  Neurologic:   Mental Status: Awake, alert, oriented x 3.   Speech: Fluent with normal prosody.   Memory: Able to recall 3 words at 1 minute and 5 minutes. Able to recall recent and remote events accurately.    Concentration: Attentive. Able to focus on history and follow multi-step commands.   Fund of Knowledge: Appropriate.   Cranial Nerves:    CN II: PERRL     CN III, IV, VI: EOMI without nystagmus    CN V: Facial sensation intact and symmetric in all 3 trigeminal distributions    CN VII: No facial asymmetry    CN VIII: Hearing intact to finger rub     CN IX and X: Palate elevates symmetrically, gag reflex not tested    CN XI: Symmetric shoulder shrug     CN XII: Tongue midline   Motor: 5/5 in upper and lower extremities bilaterally   Sensory: Intact light touch, vibration and temperature diffusely    Coordination: No evidence of past-pointing on finger to nose testing, no dysdiadochokinesia. Heel to shin intact.    DTR's: 2+ throughout without clonus.    Babinski: Toes downgoing bilaterally.   Gait: ambulates steadily without assistive device     Movements: No resting tremors or abnormal movements observed.   Musculoskeletal:    Strength: as above   Tone: Normal bulk and tone   Joints: No swelling    Studies:      Labs reviewed:      Imaging: None available  Cardiac monitoring 11/2023  Impression  1. Normal heart rate range for age during the monitoring period.  2. No significant greg or tachyarrhythmias were detected.  3. There were 14 patient triggered events recorded. Patient triggered events correlated  "with sinus rhythm at a heart rate of . One event was reported as \"fainting/falling\" during light activity and correlated with normal sinus rhythm at a heart rate of 89 bpm. One even was reported as \"short of breath/heart racing\" at rest and correlated with apparent sinus tachycardia at a heart rate of 163 bpm.     TTE 1/2/2024:  CONCLUSIONS  Normal left ventricular size, thickness, systolic function, and   diastolic function.  The left ventricular ejection fraction is estimated to be 55-60%.   Normal right ventricular size and systolic function.  Estimated right ventricular systolic pressure is 25 mmHg.  Normal left atrial size.  No significant valvular abnormalities.   Normal inferior vena cava size and inspiratory collapse.    Assessment/Plan:   Monalisa Mcknight is a 38 y.o. woman with a history of mitral regurgitation, benign breast mass status post removal, otherwise minimal past medical history who is referred to neurology for further evaluation of events of syncope and collapse.     The semiology of the events is suggestive of syncope rather than seizures.  There is no associated postictal period, unilateral or generalized convulsive or jerking movements of the body during an event, and preceding symptoms are more typical of syncope rather than a seizure aura.  She does not have risk factors for epilepsy and neurologic exam is normal, both of which are additionally reassuring.    She has undergone diagnostic workup with cardiac monitoring which did not show evidence of arrhythmias during a typical blackout event.  Since there is no clear diagnosis for her symptoms, recommend routine EEG and MRI of the brain to screen for epileptiform abnormalities.  Also ordered cranial vessel imaging to rule out vascular abnormalities that could present as syncopal events.  If the routine EEG is unremarkable, we can pursue a longer term ambulatory EEG to rule out interictal findings.    Patient was counseled to inform her " primary care and cardiologist if she has any additional blackout events.    Follow-up in approximately 8 weeks.    Tracy Chandra M.D.   Diplomate, Neurology with Special Qualification in Epilepsy, American Board of Psychiatry and Neurology   of Clinical Neurology, [facility] of Medicine  Level III Epilepsy Center, Department of Neurology at [facility]   1/5/2024      During today's encounter we discussed available treatment options and their individual side effect profiles. Total encounter time caring for patient today 50 minutes.

## 2024-01-05 NOTE — PROGRESS NOTES
"      CARDIOLOGY CONSULTATION NOTE      Date of Visit: 1/5/2024    Primary Care Provider: Pcp Pt States None  Referring Provider: No ref. provider found    Patient Name: Monalisa Mcknight    YOB: 1985  MRN: [id number]     Reason for Visit:   Follow-up     Patient Story:   Patient is followed by Dr. Tong, per his last note on 10/11/2023 \"Monalisa Mcknight is a 37 year-old woman with a past medical history of mild to moderate mitral regurgitation.  Briefly, she during the her most recent pregnancy in 2022 she began to experience episodes of near syncope.  Her symptoms were attributed to increased vagal tone during pregnancy.  She did see Dr. Castañeda at [facility] for evaluation and had a two-week cardiac monitor performed, which showed only rare PACs and PVCs with brief episodes of PSVT, and an echocardiogram, which showed mild to moderate mitral regurgitation.    She notes that over the past few months she has had increasing burden of symptoms.  Her main symptom is a sensation of a racing heart rate that we will start suddenly and last for several minutes.  This will often occur at rest and is not specifically brought on by exertion or positional changes.  She had one episode of silvestre syncope during dinner.  At that time she felt very hot/flushed with a racing heart rate and then began to lose her vision prior to fully passing out.  She is also noticed that her exertional tolerance has been worsening and she gets tired quickly with activity.  She will sometimes have chest discomfort when her heart is racing.  She has not noticed new lower extremity edema.\"    Today in clinic patient is somewhat anxious that all of her testing has been normal.  Does still have frequent episodes of lightheadedness, did have 1 episode that was severe just before Leandro.  No syncope however episode lasted for 10 minutes, was very lightheaded and dizzy and then fatigued following.  Thanks for just a. Just before leandro, was really " lightheaded and dizzy, lasted about 10 minutes.  Does think symptoms started surrounding pregnancy but have persisted after giving birth in 2022.  Denies any recent COVID or viral illness.     Medications and Allergies:     No current outpatient medications on file.     No current facility-administered medications for this visit.     Allergies   Allergen Reactions    Hydrocodone-Acetaminophen Vomiting      Medical Decision Making:     # Tachycardia, syncope: Her previous cardiac monitor did not demonstrate a clear etiology for her symptoms.    -30-day cardiac monitor overall was normal, did have 1 episode of lightheadedness associated with tachycardia and 1 episode associated with a normal heart rate in the 80s to 90s  -Unsure of etiology, possible POTS, patient is following with neurology, previous labs show some anemia during her pregnancy, will recheck CBC, chemistry for electrolytes and TSH  -Encouraged conservative management, increasing water intake to 2 to 3 L a day, incorporating electrolytes, increasing salt in diet, trialing compression stockings and increasing exercise    # Mild-moderate mitral regurgitation:   -Repeat echocardiogram shows improvement in her mitral regurgitation, believe previous echo is during patient's pregnancy, most recent shows trace mitral regurgitation and normal function otherwise    Follow Up  In 3-6 months     Cardiac Studies and Procedures:   Echocardiography  Transthoracic echo (1/2/2024)  CONCLUSIONS  Normal left ventricular size, thickness, systolic function, and   diastolic function.  The left ventricular ejection fraction is estimated to be 55-60%.   Normal right ventricular size and systolic function.  Estimated right ventricular systolic pressure is 25 mmHg.  Normal left atrial size.  No significant valvular abnormalities.   Normal inferior vena cava size and inspiratory collapse.    Limited TTE (7/18/2022)  The Ejection Fraction estimate is 55-60%   No regional wall  "motion abnormalities noted.   There is mild to moderate mitral regurgitation     TTE (5/20/2022)  Left ventricular systolic function is normal.  The Ejection Fraction estimate is 65-70%  There is mild to moderate mitral regurgitation    Stress Testing  ETT TTE (7/18/2022)  Left ventricular systolic function is normal.   There was normal augmentation of all left ventricular wall segments post-exercise   Good exercise tolerance (METs: 13.40).     Electrophysiology  Impression  Cardiac Monitor (11/20/2023)  1.   Normal heart rate range for age during the monitoring period.  2.   No significant greg or tachyarrhythmias were detected.  3.   There were 14 patient triggered events recorded.  Patient triggered events correlated with sinus rhythm at a heart rate of .  One event was reported as \"fainting/falling\" during light activity and correlated with normal sinus rhythm at a heart rate of 89 bpm.  One even was reported as \"short of breath/heart racing\" at rest and correlated with apparent sinus tachycardia at a heart rate of 163 bpm.     Cardiac Monitor (5/2022)  <1% PAC/PVC, rare SVT up to 5 beats, no VT  No pauses > 3 seconds  No AF detected  2 patient events: NSR       Vital Signs:   There were no vitals taken for this visit.   BP Readings from Last 4 Encounters:   10/11/23 100/68   12/10/22 102/56   06/27/19 100/70   05/16/19 103/60     Wt Readings from Last 4 Encounters:   10/11/23 56.2 kg (124 lb)   01/23/23 59 kg (130 lb)   12/10/22 49.9 kg (110 lb)   06/27/19 57.2 kg (126 lb)     There is no height or weight on file to calculate BMI.     Laboratories:   Lipids  No results found for: \"LDL\"    No results found for: \"HDL\"    No results found for: \"TRIGLYCERIDE\"    No results found for: \"CHOLSTRLTOT\"    No components found for: \"LPA\"      Chemistries  Lab Results   Component Value Date/Time    CREATININE 0.57 (L) 05/20/2022 01:55 PM    CREATININE 0.72 05/20/2022 09:42 AM     Lab Results   Component Value " "Date/Time    BUN 6 (L) 05/20/2022 01:55 PM    BUN 8 05/20/2022 09:42 AM     Lab Results   Component Value Date/Time    POTASSIUM 3.7 05/20/2022 01:55 PM    POTASSIUM 4.0 05/20/2022 09:42 AM     Lab Results   Component Value Date/Time    SODIUM 135 (L) 05/20/2022 01:55 PM    SODIUM 135 (L) 05/20/2022 09:42 AM     Lab Results   Component Value Date/Time    GLUCOSE 99 05/20/2022 01:55 PM    GLUCOSE 107 (H) 05/20/2022 09:42 AM     Lab Results   Component Value Date/Time    ASTSGOT 13 (L) 05/20/2022 01:55 PM    ASTSGOT 14 (L) 05/20/2022 09:42 AM     Lab Results   Component Value Date/Time    ALTSGPT 6 (L) 05/20/2022 01:55 PM    ALTSGPT 6 (L) 05/20/2022 09:42 AM     Lab Results   Component Value Date/Time    ALKPHOSPHAT 166 (H) 05/20/2022 01:55 PM    ALKPHOSPHAT 186 (H) 05/20/2022 09:42 AM     No results found for: \"HBA1C\"  No results found for: \"TSH\"  No results found for: \"NTPROBNP\"  No results found for: \"TROPONINT\"    Blood Counts  Lab Results   Component Value Date/Time    HEMOGLOBIN 10.1 (L) 05/20/2022 09:42 AM    HEMOGLOBIN 9.9 (L) 05/14/2019 10:20 PM    HEMOGLOBIN 12.4 05/14/2019 05:30 AM    HEMOGLOBIN 11.7 (L) 02/21/2019 09:59 AM     Lab Results   Component Value Date/Time    PLATELETCT 249 05/20/2022 09:42 AM    PLATELETCT 175 05/14/2019 10:20 PM    PLATELETCT 235 05/14/2019 05:30 AM    PLATELETCT 255 02/21/2019 09:59 AM     Lab Results   Component Value Date/Time    WBC 22.4 (H) 05/14/2019 10:20 PM    WBC 12.4 (H) 05/14/2019 05:30 AM    WBC 15.7 (H) 02/21/2019 09:59 AM        Physical Examination:   General: Well-appearing, no acute distress  Eyes: Extraocular movements intact, anicteric  Ears: No ear lobe crease  Neck: Full range of motion, no jugular venous distension  Pulmonary: Normal respiratory effort, no distress  Cardiovascular: Regular rate and rhythm, no murmurs or gallops appreciated  Gastrointestinal: Thin, nondistended  Extremities: Warm and well perfused, no lower extremity edema  Neurological: " Alert and oriented, no gross focal motor deficits  Psychiatric: Normal affect, normal judgment     Past History:   Past Medical History  The patient's past medical history was reviewed.  See HPI and self-reported patient medical history form for pertinent medical history to consultation.    Past Social History  The patient's social history was reviewed.  See HPI self-reported patient medical history form for pertinent social history to consultation.    Past Family History  The patient's family history was reviewed.  See HPI self-reported patient medical history form for pertinent family history to consultation.    Review of Systems  A pertinent cardiac review of systems was performed and was otherwise unremarkable except as per HPI and self-reported patient medical history form.    Stefanie Hoffman, MSN, APRN  [facility]  [phone]    Please note this dictation was created using voice recognition software.  I have made every reasonable attempt to correct obvious errors, but there may be errors of grammar and possibly content that I did not discover before finalizing the note.

## 2024-01-08 ENCOUNTER — OFFICE VISIT (OUTPATIENT)
Dept: NEUROLOGY | Facility: MEDICAL CENTER | Age: 39
End: 2024-01-08
Attending: STUDENT IN AN ORGANIZED HEALTH CARE EDUCATION/TRAINING PROGRAM
Payer: COMMERCIAL

## 2024-01-08 VITALS
BODY MASS INDEX: 22.21 KG/M2 | DIASTOLIC BLOOD PRESSURE: 64 MMHG | SYSTOLIC BLOOD PRESSURE: 110 MMHG | TEMPERATURE: 96.9 F | HEART RATE: 68 BPM | HEIGHT: 64 IN | WEIGHT: 130.07 LBS | RESPIRATION RATE: 16 BRPM | OXYGEN SATURATION: 98 %

## 2024-01-08 DIAGNOSIS — R55 SYNCOPE AND COLLAPSE: ICD-10-CM

## 2024-01-08 DIAGNOSIS — R55 BLACKOUT SPELL: ICD-10-CM

## 2024-01-08 DIAGNOSIS — R42 LIGHTHEADED: ICD-10-CM

## 2024-01-08 PROCEDURE — 99211 OFF/OP EST MAY X REQ PHY/QHP: CPT | Performed by: STUDENT IN AN ORGANIZED HEALTH CARE EDUCATION/TRAINING PROGRAM

## 2024-01-08 PROCEDURE — 99204 OFFICE O/P NEW MOD 45 MIN: CPT | Performed by: STUDENT IN AN ORGANIZED HEALTH CARE EDUCATION/TRAINING PROGRAM

## 2024-01-08 ASSESSMENT — FIBROSIS 4 INDEX: FIB4 SCORE: 0.79

## 2024-01-08 NOTE — PATIENT INSTRUCTIONS
-Routine EEG. If this is normal, we will plan for a 48 hour take home EEG study.  -MRI of the brain

## 2024-01-10 ENCOUNTER — OFFICE VISIT (OUTPATIENT)
Dept: CARDIOLOGY | Facility: MEDICAL CENTER | Age: 39
End: 2024-01-10
Attending: NURSE PRACTITIONER
Payer: COMMERCIAL

## 2024-01-10 VITALS
BODY MASS INDEX: 22.2 KG/M2 | WEIGHT: 130 LBS | RESPIRATION RATE: 18 BRPM | SYSTOLIC BLOOD PRESSURE: 100 MMHG | OXYGEN SATURATION: 97 % | HEART RATE: 95 BPM | HEIGHT: 64 IN | DIASTOLIC BLOOD PRESSURE: 98 MMHG

## 2024-01-10 DIAGNOSIS — I34.0 NONRHEUMATIC MITRAL VALVE REGURGITATION: ICD-10-CM

## 2024-01-10 DIAGNOSIS — R00.0 TACHYCARDIA: ICD-10-CM

## 2024-01-10 DIAGNOSIS — R55 SYNCOPE AND COLLAPSE: ICD-10-CM

## 2024-01-10 PROCEDURE — 3080F DIAST BP >= 90 MM HG: CPT | Performed by: NURSE PRACTITIONER

## 2024-01-10 PROCEDURE — 99211 OFF/OP EST MAY X REQ PHY/QHP: CPT | Performed by: NURSE PRACTITIONER

## 2024-01-10 PROCEDURE — 3074F SYST BP LT 130 MM HG: CPT | Performed by: NURSE PRACTITIONER

## 2024-01-10 PROCEDURE — 99214 OFFICE O/P EST MOD 30 MIN: CPT | Performed by: NURSE PRACTITIONER

## 2024-01-10 ASSESSMENT — FIBROSIS 4 INDEX: FIB4 SCORE: 0.79

## 2024-02-09 ENCOUNTER — HOSPITAL ENCOUNTER (OUTPATIENT)
Dept: LAB | Facility: MEDICAL CENTER | Age: 39
End: 2024-02-09
Attending: NURSE PRACTITIONER
Payer: COMMERCIAL

## 2024-02-09 DIAGNOSIS — R55 SYNCOPE AND COLLAPSE: ICD-10-CM

## 2024-02-09 LAB
ALBUMIN SERPL BCP-MCNC: 4.6 G/DL (ref 3.2–4.9)
ALBUMIN/GLOB SERPL: 1.5 G/DL
ALP SERPL-CCNC: 74 U/L (ref 30–99)
ALT SERPL-CCNC: 8 U/L (ref 2–50)
ANION GAP SERPL CALC-SCNC: 11 MMOL/L (ref 7–16)
AST SERPL-CCNC: 14 U/L (ref 12–45)
BILIRUB SERPL-MCNC: 0.4 MG/DL (ref 0.1–1.5)
BUN SERPL-MCNC: 11 MG/DL (ref 8–22)
CALCIUM ALBUM COR SERPL-MCNC: 9 MG/DL (ref 8.5–10.5)
CALCIUM SERPL-MCNC: 9.5 MG/DL (ref 8.5–10.5)
CHLORIDE SERPL-SCNC: 102 MMOL/L (ref 96–112)
CO2 SERPL-SCNC: 24 MMOL/L (ref 20–33)
CREAT SERPL-MCNC: 0.69 MG/DL (ref 0.5–1.4)
ERYTHROCYTE [DISTWIDTH] IN BLOOD BY AUTOMATED COUNT: 46 FL (ref 35.9–50)
GFR SERPLBLD CREATININE-BSD FMLA CKD-EPI: 114 ML/MIN/1.73 M 2
GLOBULIN SER CALC-MCNC: 3.1 G/DL (ref 1.9–3.5)
GLUCOSE SERPL-MCNC: 89 MG/DL (ref 65–99)
HCT VFR BLD AUTO: 44.6 % (ref 37–47)
HGB BLD-MCNC: 14.9 G/DL (ref 12–16)
MCH RBC QN AUTO: 30.7 PG (ref 27–33)
MCHC RBC AUTO-ENTMCNC: 33.4 G/DL (ref 32.2–35.5)
MCV RBC AUTO: 91.8 FL (ref 81.4–97.8)
PLATELET # BLD AUTO: 243 K/UL (ref 164–446)
PMV BLD AUTO: 11.2 FL (ref 9–12.9)
POTASSIUM SERPL-SCNC: 4.1 MMOL/L (ref 3.6–5.5)
PROT SERPL-MCNC: 7.7 G/DL (ref 6–8.2)
RBC # BLD AUTO: 4.86 M/UL (ref 4.2–5.4)
SODIUM SERPL-SCNC: 137 MMOL/L (ref 135–145)
TSH SERPL DL<=0.005 MIU/L-ACNC: 2.23 UIU/ML (ref 0.38–5.33)
WBC # BLD AUTO: 5.7 K/UL (ref 4.8–10.8)

## 2024-02-09 PROCEDURE — 84443 ASSAY THYROID STIM HORMONE: CPT

## 2024-02-09 PROCEDURE — 85027 COMPLETE CBC AUTOMATED: CPT

## 2024-02-09 PROCEDURE — 80053 COMPREHEN METABOLIC PANEL: CPT

## 2024-02-09 PROCEDURE — 36415 COLL VENOUS BLD VENIPUNCTURE: CPT

## 2024-02-25 ENCOUNTER — HOSPITAL ENCOUNTER (OUTPATIENT)
Dept: RADIOLOGY | Facility: MEDICAL CENTER | Age: 39
End: 2024-02-25
Attending: STUDENT IN AN ORGANIZED HEALTH CARE EDUCATION/TRAINING PROGRAM
Payer: COMMERCIAL

## 2024-02-25 DIAGNOSIS — R55 SYNCOPE AND COLLAPSE: ICD-10-CM

## 2024-02-25 PROCEDURE — 70549 MR ANGIOGRAPH NECK W/O&W/DYE: CPT

## 2024-02-25 PROCEDURE — A9579 GAD-BASE MR CONTRAST NOS,1ML: HCPCS | Performed by: STUDENT IN AN ORGANIZED HEALTH CARE EDUCATION/TRAINING PROGRAM

## 2024-02-25 PROCEDURE — 70544 MR ANGIOGRAPHY HEAD W/O DYE: CPT

## 2024-02-25 PROCEDURE — 700117 HCHG RX CONTRAST REV CODE 255: Performed by: STUDENT IN AN ORGANIZED HEALTH CARE EDUCATION/TRAINING PROGRAM

## 2024-02-25 PROCEDURE — 70551 MRI BRAIN STEM W/O DYE: CPT

## 2024-02-25 RX ADMIN — GADOTERIDOL 13 ML: 279.3 INJECTION, SOLUTION INTRAVENOUS at 14:45

## 2024-03-14 ENCOUNTER — OFFICE VISIT (OUTPATIENT)
Dept: NEUROLOGY | Facility: MEDICAL CENTER | Age: 39
End: 2024-03-14
Attending: STUDENT IN AN ORGANIZED HEALTH CARE EDUCATION/TRAINING PROGRAM
Payer: COMMERCIAL

## 2024-03-14 VITALS
HEART RATE: 96 BPM | BODY MASS INDEX: 23.22 KG/M2 | OXYGEN SATURATION: 95 % | DIASTOLIC BLOOD PRESSURE: 58 MMHG | SYSTOLIC BLOOD PRESSURE: 112 MMHG | TEMPERATURE: 96.7 F | HEIGHT: 64 IN | WEIGHT: 136.02 LBS

## 2024-03-14 DIAGNOSIS — R06.02 SHORTNESS OF BREATH: ICD-10-CM

## 2024-03-14 DIAGNOSIS — R42 EPISODIC LIGHTHEADEDNESS: ICD-10-CM

## 2024-03-14 DIAGNOSIS — R63.5 WEIGHT GAIN: ICD-10-CM

## 2024-03-14 DIAGNOSIS — R55 SYNCOPE AND COLLAPSE: ICD-10-CM

## 2024-03-14 DIAGNOSIS — L65.9 HAIR LOSS: ICD-10-CM

## 2024-03-14 PROCEDURE — 99215 OFFICE O/P EST HI 40 MIN: CPT | Performed by: STUDENT IN AN ORGANIZED HEALTH CARE EDUCATION/TRAINING PROGRAM

## 2024-03-14 PROCEDURE — 99211 OFF/OP EST MAY X REQ PHY/QHP: CPT | Performed by: STUDENT IN AN ORGANIZED HEALTH CARE EDUCATION/TRAINING PROGRAM

## 2024-03-14 ASSESSMENT — FIBROSIS 4 INDEX: FIB4 SCORE: 0.75

## 2024-03-14 ASSESSMENT — PATIENT HEALTH QUESTIONNAIRE - PHQ9: CLINICAL INTERPRETATION OF PHQ2 SCORE: 0

## 2024-03-14 NOTE — PROGRESS NOTES
"[facility]  Follow up visit    Patient name: Monalisa Mcknight  YOB: 1985  MRN: [id number]  Date of visit: 3/14/2024     Background:    Monalisa Mcknight is a 38 y.o. woman with minimal PMH who is being seen in follow up for episodes of syncope and collapse.     Details of previous history  The events started at the end of pregnancy in 2022.  The events became more frequent after she delivered her child.  At that time, she was having episodes of shortness of breath even at rest and episodes of passing out without exertion.  She underwent a cardiac workup and was found to have mitral valve insufficiency with regurgitation.  She underwent cardiac monitoring, the most recent of which was in November 2023 which captured 1 event involving \"fainting and falling\" during light activity at which time monitor exhibited normal sinus rhythm with a rate of 89 bpm.  She also had a brief episode of shortness of breath with heart racing, with corresponding sinus tachycardia at a rate of 163 bpm.     The events in question involves starting to feel hot,  describes that her face turns pale and loses coloration, sees static in vision followed by blackout and collapse.  No body movements or jerking noted during times of blackout spells.  There is an immediate return to baseline after the event.  She feels tired after 1 such event.  There is no urinary or bowel incontinence, no tongue biting.     Onset: 2022  Semiology: 1) Stereotyped events - starts getting hot, heart starts racing for over 1 minute --> world goes black followed by collapsing. Unconscious for < 1 minute. No post-event confusion. No  convulsions or body movements noted at the time of loss of consciousness. Feels tired afterwards. With some events she will fall to the ground for a short period of time ~15 minutes.   Frequency: Approximately 1x per month.   Duration of spells: 2 minutes  Triggers: none identified  Current treatment: " none     History reviewed.  She has a history of benign breast tumor status post removal.     Mood: No history of mood issues        Driving: yes        Risk factors for epileptic seizures:  Normal birth history, no complications, born at term.   No history of significant head trauma.   No history of stroke or meningitis.  No family history of epilepsy.   No febrile seizures.          Interval history:    Gained 15 lb in the last two months, having hair loss coming out in clumps. She has not changed anything about her habits, water intake and food intake.     Still having similar episodes. Has shortness of breath followed by feeling very hot, then lightheaded but does not have tunnel vision or loss of consciousness.     No history of muscle fatigue. No history of perioral cyanosis or cyanosis in the fingertips.     She also mentioned having heavy periods. Recent hemoglobin was normal.     Normal diet, not a vegan or vegetarian. No history of diarrhea, has BM every 2-3 days.     She gets short of breath very quickly which has been worsening over the last several months. Worse in the winter/cold.     No recent history of panic symptoms or mood disturbance.     Family history: history of stroke in grandmother, triple bypass in father related to alcohol use.      Current Medications: No current outpatient medications on file.    Allergies:   Allergies   Allergen Reactions    Hydrocodone-Acetaminophen Vomiting         Physical Exam:   Ambulatory Vitals  Vitals:    03/14/24 1050   BP: 112/58   Pulse: 96   Temp: 35.9 °C (96.7 °F)   SpO2: 95%       Constitutional: Well-developed, well-nourished, good hygiene. Appears stated age.  Respiratory: normal respiratory effort  Skin: Warm, dry, intact. No rashes observed.  Neurologic:   Mental Status: Awake, alert, oriented x 4.   Speech: Fluent with normal prosody.   Memory: Able to recall recent and remote events accurately.    Concentration: Attentive. Able to focus on history  "and follow multi-step commands.   Fund of Knowledge: Appropriate.   Cranial Nerves:    CN II: PERRL, visual fields full    CN III, IV, VI: EOMI without nystagmus    CN V: Facial sensation intact and symmetric in all 3 trigeminal distributions    CN VII: No facial asymmetry    CN VIII: Hearing intact to voice    CN IX and X: Palate elevates symmetrically, gag reflex not tested    CN XI: Symmetric shoulder shrug     CN XII: Tongue midline   Motor: 5/5 in upper and lower extremities bilaterally   Sensory: Intact and equal to light touch diffusely    Coordination: No evidence of past-pointing on finger to nose testing, no dysdiadochokinesia. Heel to shin intact.    Gait: ambulates steadily without assistive device. Romberg negative. Able to perform tandem gait.    Movements: No resting tremors or abnormal movements observed.     Studies:      Labs reviewed:  Recent CBC, TSH, CMP normal    Imaging:     MRI Brain wo contrast 3T 2/25/2024- normal   MRA head and neck 2/25/2024- normal      Cardiac monitoring 11/2023  Impression  1. Normal heart rate range for age during the monitoring period.  2. No significant greg or tachyarrhythmias were detected.  3. There were 14 patient triggered events recorded. Patient triggered events correlated with sinus rhythm at a heart rate of . One event was reported as \"fainting/falling\" during light activity and correlated with normal sinus rhythm at a heart rate of 89 bpm. One even was reported as \"short of breath/heart racing\" at rest and correlated with apparent sinus tachycardia at a heart rate of 163 bpm.      TTE 1/2/2024:  CONCLUSIONS  Normal left ventricular size, thickness, systolic function, and   diastolic function.  The left ventricular ejection fraction is estimated to be 55-60%.   Normal right ventricular size and systolic function.  Estimated right ventricular systolic pressure is 25 mmHg.  Normal left atrial size.  No significant valvular abnormalities.   Normal " inferior vena cava size and inspiratory collapse.        Assessment/Plan:   Monalisa Mcknight is a 38 y.o. woman with a history of mitral regurgitation, benign breast mass status post removal, otherwise minimal past medical history being seen in follow up for events of syncope and collapse. She has not had further episodes of full loss of consciousness, though still experiences pre-syncopal symptoms of lightheadedness accompanied by feeling hot, without visual changes or syncope. She in addition reports frequent shortness of breath particularly with exertion.     MRI Brain and MRA of the head and neck were reassuringly normal. Neurologic exam also stable/normal.     The symptoms are not consistent with epileptic seizures. She does not have a primary care provider. Discussed that this is essential for ongoing care and placed referral to [facility].   She is now following with cardiology. Cardiac monitoring x 14d and TTE were normal. Recommend orthostatic testing at future PCP visits. Tilt table testing may be a future consideration. Discussed conservative measures for episodic lightheadedness including maintaining good hydration and electrolyte intake, wearing compression stockings.     A separate issue is hair loss and mild weight gain. Ordered basic screening labs for presyncope and other symptoms.     Orders Placed This Encounter    MAGNESIUM    PHOSPHORUS    BIN REFLEXIVE PROFILE    CREATINE KINASE    VITAMIN D,25 HYDROXY (DEFICIENCY)    VITAMIN B1    VITAMIN B12    VITAMIN B6    Referral to establish with PCP       Follow up as needed.    Tracy Chandra M.D.   Diplomate, Neurology with Special Qualification in Epilepsy, American Board of Psychiatry and Neurology   of Clinical Neurology, [facility]  Level III Epilepsy Center, Department of Neurology at [facility]   3/14/2024      During today's encounter we discussed available treatment options  and their individual side effect profiles. Total encounter time caring for patient today 45 minutes.

## 2024-06-24 ENCOUNTER — APPOINTMENT (OUTPATIENT)
Dept: CARDIOLOGY | Facility: MEDICAL CENTER | Age: 39
End: 2024-06-24
Attending: INTERNAL MEDICINE
Payer: COMMERCIAL

## 2024-06-24 NOTE — PROGRESS NOTES
CARDIOLOGY CONSULTATION NOTE      Date of Visit: 6/24/2024    Primary Care Provider: Pcp Pt States None  Referring Provider: No ref. provider found    Patient Name: Monalisa Mcknight    YOB: 1985  MRN: [id number]     Reason for Visit:   Tachycardia, syncope     Patient Story:   Monalisa Mcknight is a 38 year-old woman with a past medical history of mild to moderate mitral regurgitation.  Briefly, she during the her most recent pregnancy in 2022 she began to experience episodes of near syncope.  Her symptoms were attributed to increased vagal tone during pregnancy.  She initially saw Dr. Castañeda at [facility] for evaluation and had a two-week cardiac monitor performed, which showed only rare PACs and PVCs with brief episodes of PSVT, and an echocardiogram, which showed mild to moderate mitral regurgitation.  She was referred to my clinic in the setting of ongoing symptoms.  At that visit she noted and increasing burden of racing heart rates that we would start suddenly and last for several minutes.  This typically would occur at rest and were not not specifically brought on by exertion or positional changes.  She also reported one episode of silvestre syncope during dinner.  At that time she felt very hot/flushed with a racing heart rate and then began to lose her vision prior to fully passing out.  She also had noticed that her exertional tolerance had been worsening and she felt like she was getting tired more quickly with activity.    She was ordered for a longer monitor and an echocardiogram, both of which were unremarkable     Medications and Allergies:     No current outpatient medications on file.     No current facility-administered medications for this visit.     Allergies   Allergen Reactions    Hydrocodone-Acetaminophen Vomiting      Medical Decision Making:   # Tachycardia, syncope: Her previous cardiac monitor did not demonstrate a clear etiology for her symptoms.  However, as she has been having  "increasing burden of symptoms will obtain a repeat cardiac monitor for longer duration.  -Obtain 30-day cardiac monitor    # Mild-moderate mitral regurgitation: Given worsening symptoms, will also obtain a repeat echocardiogram.  -Obtain echocardiogram    Follow Up  6 weeks     Cardiac Studies and Procedures:   Echocardiography  TTE (1/2/2024)  Normal left ventricular size, thickness, systolic function, and diastolic function.  The left ventricular ejection fraction is estimated to be 55-60%.   Normal right ventricular size and systolic function.  Estimated right ventricular systolic pressure is 25 mmHg.  Normal left atrial size.  No significant valvular abnormalities.   Normal inferior vena cava size and inspiratory collapse.    Limited TTE (7/18/2022)  The Ejection Fraction estimate is 55-60%   No regional wall motion abnormalities noted.   There is mild to moderate mitral regurgitation     TTE (5/20/2022)  Left ventricular systolic function is normal.  The Ejection Fraction estimate is 65-70%  There is mild to moderate mitral regurgitation    Stress Testing  ETT TTE (7/18/2022)  Left ventricular systolic function is normal.   There was normal augmentation of all left ventricular wall segments post-exercise   Good exercise tolerance (METs: 13.40).     Electrophysiology  30-day (11/20/2023)  1.   Normal heart rate range for age during the monitoring period.   2.   No significant greg or tachyarrhythmias were detected.   3.   There were 14 patient triggered events recorded.  Patient triggered events correlated with sinus rhythm at a heart rate of .  One event was reported as \"fainting/falling\" during light activity and correlated with normal sinus rhythm at a heart rate of 89 bpm.  One even was reported as \"short of breath/heart racing\" at rest and correlated with apparent sinus tachycardia at a heart rate of 163 bpm.    2-week cardiac Monitor (5/2022)  <1% PAC/PVC, rare SVT up to 5 beats, no VT  No pauses > " "3 seconds  No AF detected  2 patient events: NSR     Vital Signs:   There were no vitals taken for this visit.   BP Readings from Last 4 Encounters:   03/14/24 112/58   01/10/24 (!) 100/98   01/08/24 110/64   10/11/23 100/68     Wt Readings from Last 4 Encounters:   03/14/24 61.7 kg (136 lb 0.4 oz)   01/10/24 59 kg (130 lb)   01/08/24 59 kg (130 lb 1.1 oz)   10/11/23 56.2 kg (124 lb)     There is no height or weight on file to calculate BMI.     Laboratories:   Lipids  No results found for: \"LDL\"    No results found for: \"HDL\"    No results found for: \"TRIGLYCERIDE\"    No results found for: \"CHOLSTRLTOT\"    No components found for: \"LPA\"      Chemistries  Lab Results   Component Value Date/Time    CREATININE 0.69 02/09/2024 09:55 AM    CREATININE 0.57 (L) 05/20/2022 01:55 PM    CREATININE 0.72 05/20/2022 09:42 AM     Lab Results   Component Value Date/Time    BUN 11 02/09/2024 09:55 AM    BUN 6 (L) 05/20/2022 01:55 PM    BUN 8 05/20/2022 09:42 AM     Lab Results   Component Value Date/Time    POTASSIUM 4.1 02/09/2024 09:55 AM    POTASSIUM 3.7 05/20/2022 01:55 PM    POTASSIUM 4.0 05/20/2022 09:42 AM     Lab Results   Component Value Date/Time    SODIUM 137 02/09/2024 09:55 AM    SODIUM 135 (L) 05/20/2022 01:55 PM    SODIUM 135 (L) 05/20/2022 09:42 AM     Lab Results   Component Value Date/Time    GLUCOSE 89 02/09/2024 09:55 AM    GLUCOSE 99 05/20/2022 01:55 PM    GLUCOSE 107 (H) 05/20/2022 09:42 AM     Lab Results   Component Value Date/Time    ASTSGOT 14 02/09/2024 09:55 AM    ASTSGOT 13 (L) 05/20/2022 01:55 PM    ASTSGOT 14 (L) 05/20/2022 09:42 AM     Lab Results   Component Value Date/Time    ALTSGPT 8 02/09/2024 09:55 AM    ALTSGPT 6 (L) 05/20/2022 01:55 PM    ALTSGPT 6 (L) 05/20/2022 09:42 AM     Lab Results   Component Value Date/Time    ALKPHOSPHAT 74 02/09/2024 09:55 AM    ALKPHOSPHAT 166 (H) 05/20/2022 01:55 PM    ALKPHOSPHAT 186 (H) 05/20/2022 09:42 AM     No results found for: \"HBA1C\"  No results found " "for: \"TSH\"  No results found for: \"NTPROBNP\"  No results found for: \"TROPONINT\"    Blood Counts  Lab Results   Component Value Date/Time    HEMOGLOBIN 14.9 02/09/2024 09:55 AM    HEMOGLOBIN 10.1 (L) 05/20/2022 09:42 AM    HEMOGLOBIN 9.9 (L) 05/14/2019 10:20 PM    HEMOGLOBIN 12.4 05/14/2019 05:30 AM     Lab Results   Component Value Date/Time    PLATELETCT 243 02/09/2024 09:55 AM    PLATELETCT 249 05/20/2022 09:42 AM    PLATELETCT 175 05/14/2019 10:20 PM    PLATELETCT 235 05/14/2019 05:30 AM     Lab Results   Component Value Date/Time    WBC 5.7 02/09/2024 09:55 AM    WBC 22.4 (H) 05/14/2019 10:20 PM    WBC 12.4 (H) 05/14/2019 05:30 AM        Physical Examination:   General: Well-appearing, no acute distress  Eyes: Extraocular movements intact, anicteric  Ears: No ear lobe crease  Neck: Full range of motion, no jugular venous distension  Pulmonary: Normal respiratory effort, no distress  Cardiovascular: Regular rate and rhythm, no murmurs or gallops appreciated  Gastrointestinal: Thin, nondistended  Extremities: Warm and well perfused, no lower extremity edema  Neurological: Alert and oriented, no gross focal motor deficits  Psychiatric: Normal affect, normal judgment     Past History:   Past Medical History  The patient's past medical history was reviewed.  See HPI and self-reported patient medical history form for pertinent medical history to consultation.    Past Social History  The patient's social history was reviewed.  See [facility] self-reported patient medical history form for pertinent social history to consultation.    Past Family History  The patient's family history was reviewed.  See [facility] self-reported patient medical history form for pertinent family history to consultation.    Review of Systems  A pertinent cardiac review of systems was performed and was otherwise unremarkable except as per HPI and self-reported patient medical history form.        Rigo Tong MD, [facility]  Interventional Cardiology  [facility]  [facility], [facility]  [address] [place] [address]  [place] [address]  Phone: [phone]  Fax: [phone]

## 2024-07-05 RX ORDER — METOCLOPRAMIDE 10 MG/1
TABLET ORAL
COMMUNITY
Start: 2024-06-22 | End: 2024-07-09

## 2024-07-05 RX ORDER — ONDANSETRON 4 MG/1
TABLET, ORALLY DISINTEGRATING ORAL
COMMUNITY
Start: 2024-06-22 | End: 2024-07-09

## 2024-07-05 RX ORDER — DICYCLOMINE HCL 20 MG
TABLET ORAL
COMMUNITY
Start: 2024-06-22 | End: 2024-07-09

## 2024-07-09 ENCOUNTER — TELEMEDICINE (OUTPATIENT)
Dept: MEDICAL GROUP | Facility: MEDICAL CENTER | Age: 39
End: 2024-07-09
Attending: STUDENT IN AN ORGANIZED HEALTH CARE EDUCATION/TRAINING PROGRAM
Payer: COMMERCIAL

## 2024-07-09 VITALS — BODY MASS INDEX: 22.2 KG/M2 | HEIGHT: 64 IN | WEIGHT: 130 LBS

## 2024-07-09 DIAGNOSIS — R53.83 OTHER FATIGUE: ICD-10-CM

## 2024-07-09 DIAGNOSIS — R00.0 TACHYCARDIA: ICD-10-CM

## 2024-07-09 DIAGNOSIS — R63.5 WEIGHT GAIN, ABNORMAL: ICD-10-CM

## 2024-07-09 DIAGNOSIS — N92.6 IRREGULAR MENSES: ICD-10-CM

## 2024-07-09 DIAGNOSIS — R55 SYNCOPE AND COLLAPSE: ICD-10-CM

## 2024-07-09 PROCEDURE — 99214 OFFICE O/P EST MOD 30 MIN: CPT | Performed by: FAMILY MEDICINE

## 2024-07-09 ASSESSMENT — FIBROSIS 4 INDEX: FIB4 SCORE: 0.84

## 2024-07-16 ENCOUNTER — TELEMEDICINE (OUTPATIENT)
Dept: CARDIOLOGY | Facility: MEDICAL CENTER | Age: 39
End: 2024-07-16
Attending: INTERNAL MEDICINE
Payer: COMMERCIAL

## 2024-07-16 VITALS
HEIGHT: 64 IN | HEART RATE: 96 BPM | SYSTOLIC BLOOD PRESSURE: 112 MMHG | WEIGHT: 128 LBS | DIASTOLIC BLOOD PRESSURE: 58 MMHG | BODY MASS INDEX: 21.85 KG/M2

## 2024-07-16 DIAGNOSIS — I34.0 NONRHEUMATIC MITRAL VALVE REGURGITATION: ICD-10-CM

## 2024-07-16 DIAGNOSIS — R00.0 TACHYCARDIA: ICD-10-CM

## 2024-07-16 PROCEDURE — 99214 OFFICE O/P EST MOD 30 MIN: CPT | Mod: 95 | Performed by: INTERNAL MEDICINE

## 2024-07-16 ASSESSMENT — FIBROSIS 4 INDEX: FIB4 SCORE: 0.84

## 2024-07-18 ENCOUNTER — HOSPITAL ENCOUNTER (OUTPATIENT)
Dept: CARDIOLOGY | Facility: MEDICAL CENTER | Age: 39
End: 2024-07-18
Attending: FAMILY MEDICINE
Payer: COMMERCIAL

## 2024-07-18 DIAGNOSIS — R00.0 TACHYCARDIA: ICD-10-CM

## 2024-07-18 DIAGNOSIS — R55 SYNCOPE AND COLLAPSE: ICD-10-CM

## 2024-07-18 PROCEDURE — 93660 TILT TABLE EVALUATION: CPT

## 2024-11-06 ENCOUNTER — TELEPHONE (OUTPATIENT)
Dept: MEDICAL GROUP | Facility: MEDICAL CENTER | Age: 39
End: 2024-11-06
Payer: COMMERCIAL

## 2024-11-06 NOTE — TELEPHONE ENCOUNTER
Pt called and LVM requesting a referral sent so that she can get an MRI for her R knee. Pt injured it over the weekend

## 2025-01-03 ENCOUNTER — OFFICE VISIT (OUTPATIENT)
Dept: URGENT CARE | Facility: CLINIC | Age: 40
End: 2025-01-03
Payer: COMMERCIAL

## 2025-01-03 VITALS
HEIGHT: 64 IN | BODY MASS INDEX: 22.71 KG/M2 | HEART RATE: 82 BPM | WEIGHT: 133 LBS | DIASTOLIC BLOOD PRESSURE: 78 MMHG | OXYGEN SATURATION: 98 % | TEMPERATURE: 97.5 F | RESPIRATION RATE: 20 BRPM | SYSTOLIC BLOOD PRESSURE: 110 MMHG

## 2025-01-03 DIAGNOSIS — R50.9 FEVER, UNSPECIFIED FEVER CAUSE: ICD-10-CM

## 2025-01-03 DIAGNOSIS — R05.1 ACUTE COUGH: ICD-10-CM

## 2025-01-03 LAB
FLUAV RNA SPEC QL NAA+PROBE: NEGATIVE
FLUBV RNA SPEC QL NAA+PROBE: NEGATIVE
RSV RNA SPEC QL NAA+PROBE: NEGATIVE
SARS-COV-2 RNA RESP QL NAA+PROBE: NEGATIVE

## 2025-01-03 PROCEDURE — 3074F SYST BP LT 130 MM HG: CPT | Performed by: PHYSICIAN ASSISTANT

## 2025-01-03 PROCEDURE — 3078F DIAST BP <80 MM HG: CPT | Performed by: PHYSICIAN ASSISTANT

## 2025-01-03 PROCEDURE — 99213 OFFICE O/P EST LOW 20 MIN: CPT | Performed by: PHYSICIAN ASSISTANT

## 2025-01-03 PROCEDURE — 0241U POCT CEPHEID COV-2, FLU A/B, RSV - PCR: CPT | Performed by: PHYSICIAN ASSISTANT

## 2025-01-03 RX ORDER — DEXTROMETHORPHAN HYDROBROMIDE AND PROMETHAZINE HYDROCHLORIDE 15; 6.25 MG/5ML; MG/5ML
5 SYRUP ORAL EVERY 4 HOURS PRN
Qty: 118 ML | Refills: 0 | Status: SHIPPED | OUTPATIENT
Start: 2025-01-03

## 2025-01-03 RX ORDER — DEXTROMETHORPHAN HYDROBROMIDE AND PROMETHAZINE HYDROCHLORIDE 15; 6.25 MG/5ML; MG/5ML
5 SYRUP ORAL
Qty: 118 ML | Refills: 0 | Status: SHIPPED | OUTPATIENT
Start: 2025-01-03

## 2025-01-03 RX ORDER — BENZONATATE 100 MG/1
100 CAPSULE ORAL 3 TIMES DAILY PRN
Qty: 20 CAPSULE | Refills: 0 | Status: SHIPPED | OUTPATIENT
Start: 2025-01-03

## 2025-01-03 ASSESSMENT — FIBROSIS 4 INDEX: FIB4 SCORE: 0.86

## 2025-01-04 NOTE — PROGRESS NOTES
"Subjective:     Verbal consent was acquired by the patient to use [employer] ambient listening note generation during this visit     Monalisa Mcknight is a 39 y.o. female who presents for Cough (today)       History of Present Illness  The patient presents for evaluation of a cough.    She reports the onset of a mild cough this morning, which has since escalated to a severe coughing fit triggered by inhalation. She also experiences shortness of breath during these coughing episodes. Additionally, she describes a sensation of heat, despite not having a fever, and feels lightheaded. She is experiencing mild body aches and fatigue. She does not have any congestion but reports a slight grippiness. She experiences throat soreness only during coughing episodes. She has no history of asthma. Her  administered Advil earlier today.       MEDICATIONS  Current: Advil            Medications:  This patient does not have an active medication from one of the medication groupers.    Allergies:             Patient has no known allergies.    Past Social Hx:  Monalisa Mcknight  reports that she has never smoked. She has never used smokeless tobacco. She reports that she does not drink alcohol and does not use drugs.           Problem list, medications, and allergies reviewed by myself today in Epic.     Objective:     /78   Pulse 82   Temp 36.4 °C (97.5 °F) (Temporal)   Resp 20   Ht 1.626 m (5' 4\")   Wt 60.3 kg (133 lb)   SpO2 98%   BMI 22.83 kg/m²     Physical Exam  Vitals and nursing note reviewed.   Constitutional:       General: She is not in acute distress.     Appearance: She is well-developed. She is not ill-appearing or diaphoretic.   HENT:      Head: Normocephalic.      Right Ear: Tympanic membrane, ear canal and external ear normal.      Left Ear: Tympanic membrane, ear canal and external ear normal.      Nose: Mucosal edema and rhinorrhea present.      Mouth/Throat:      Pharynx: Posterior oropharyngeal " erythema present.   Eyes:      General:         Right eye: No discharge.         Left eye: No discharge.      Conjunctiva/sclera: Conjunctivae normal.      Pupils: Pupils are equal, round, and reactive to light.   Cardiovascular:      Rate and Rhythm: Normal rate and regular rhythm.      Pulses: Normal pulses.      Heart sounds: Normal heart sounds. No murmur heard.     No friction rub.   Pulmonary:      Effort: Pulmonary effort is normal. No accessory muscle usage or respiratory distress.      Breath sounds: No stridor. No wheezing, rhonchi or rales.      Comments: Lungs are clear to auscultation bilaterally, no rhonchi rales or wheezes  Abdominal:      Palpations: Abdomen is soft.   Musculoskeletal:         General: Normal range of motion.      Cervical back: Normal range of motion.      Right lower leg: No edema.      Left lower leg: No edema.   Lymphadenopathy:      Cervical: No cervical adenopathy.   Skin:     General: Skin is warm and dry.   Neurological:      Mental Status: She is alert and oriented to person, place, and time.             Results for orders placed or performed in visit on 01/03/25   POCT CEPHEID COV-2, FLU A/B, RSV - PCR    Collection Time: 01/03/25  7:02 PM   Result Value Ref Range    SARS-CoV-2 by PCR Negative Negative, Invalid    Influenza virus A RNA Negative Negative, Invalid    Influenza virus B, PCR Negative Negative, Invalid    RSV, PCR Negative Negative, Invalid         Assessment/Plan:     Diagnosis and Associated Orders:     1. Fever, unspecified fever cause  - POCT CEPHEID COV-2, FLU A/B, RSV - PCR  - benzonatate (TESSALON) 100 MG Cap; Take 1 Capsule by mouth 3 times a day as needed for Cough.  Dispense: 20 Capsule; Refill: 0  - promethazine-dextromethorphan (PROMETHAZINE-DM) 6.25-15 MG/5ML syrup; Take 5 mL by mouth every four hours as needed for Cough.  Dispense: 118 mL; Refill: 0    2. Acute cough  - benzonatate (TESSALON) 100 MG Cap; Take 1 Capsule by mouth 3 times a day as  needed for Cough.  Dispense: 20 Capsule; Refill: 0  - promethazine-dextromethorphan (PROMETHAZINE-DM) 6.25-15 MG/5ML syrup; Take 5 mL by mouth every four hours as needed for Cough.  Dispense: 118 mL; Refill: 0  - promethazine-dextromethorphan (PROMETHAZINE-DM) 6.25-15 MG/5ML syrup; Take 5 mL by mouth at bedtime as needed for Cough.  Dispense: 118 mL; Refill: 0        Medical Decision Making:    Pleasant 39 y.o. presents to clinic with:    Assessment & Plan  Patient presents with sudden onset cough this a.m.  Viral testing negative.  Lungs clear to auscultation bilaterally.  She is not hypoxic and nontachypneic.  It is possible PCR testing is not sensitive given acute onset of symptoms versus a possible bacterial pneumonia.     Her lungs are clear, oxygen levels are normal, and there is no fever currently, likely due to earlier Advil use. Blood pressure is normal, and there is no indication of acute pneumonia. . A prescription for cough medicine will be provided, but she is advised to wait for the results of the viral panel before starting any medication. She is advised to return if her cough worsens, she experiences shortness of breath, continues to have fevers, or coughs up blood, at which point a chest x-ray will be considered.    I personally reviewed prior external notes and test results pertinent to today's visit. Patient is clinically stable at today's urgent care visit.  Patient appears nontoxic with no acute distress noted. Appropriate for outpatient care at this time.  Return to clinic or go to ED if symptoms worsen or persist.  Red flag symptoms discussed.  Patient/Parent/Guardian voices understanding.   All side effects of medication discussed including allergic response, GI upset, tendon injury, rash, sedation etc    Please note that this dictation was created using voice recognition software. I have made a reasonable attempt to correct obvious errors, but I expect that there are errors of grammar and  possibly content that I did not discover before finalizing the note.    This note was electronically signed by Naty Levy PA-C

## 2025-01-05 ENCOUNTER — APPOINTMENT (OUTPATIENT)
Dept: RADIOLOGY | Facility: IMAGING CENTER | Age: 40
End: 2025-01-05
Payer: COMMERCIAL

## 2025-01-05 ENCOUNTER — OFFICE VISIT (OUTPATIENT)
Dept: URGENT CARE | Facility: CLINIC | Age: 40
End: 2025-01-05
Payer: COMMERCIAL

## 2025-01-05 VITALS
HEART RATE: 110 BPM | RESPIRATION RATE: 18 BRPM | OXYGEN SATURATION: 94 % | SYSTOLIC BLOOD PRESSURE: 110 MMHG | BODY MASS INDEX: 22.71 KG/M2 | TEMPERATURE: 101.3 F | HEIGHT: 64 IN | DIASTOLIC BLOOD PRESSURE: 74 MMHG | WEIGHT: 133 LBS

## 2025-01-05 DIAGNOSIS — R05.1 ACUTE COUGH: ICD-10-CM

## 2025-01-05 DIAGNOSIS — J02.0 STREP PHARYNGITIS: ICD-10-CM

## 2025-01-05 DIAGNOSIS — R09.81 NASAL CONGESTION: ICD-10-CM

## 2025-01-05 DIAGNOSIS — R50.9 FEVER, UNSPECIFIED FEVER CAUSE: ICD-10-CM

## 2025-01-05 DIAGNOSIS — J02.9 PHARYNGITIS, UNSPECIFIED ETIOLOGY: ICD-10-CM

## 2025-01-05 DIAGNOSIS — J10.1 INFLUENZA A: ICD-10-CM

## 2025-01-05 LAB
FLUAV RNA SPEC QL NAA+PROBE: POSITIVE
FLUBV RNA SPEC QL NAA+PROBE: NEGATIVE
RSV RNA SPEC QL NAA+PROBE: NEGATIVE
S PYO DNA SPEC NAA+PROBE: DETECTED
SARS-COV-2 RNA RESP QL NAA+PROBE: NEGATIVE

## 2025-01-05 PROCEDURE — 3074F SYST BP LT 130 MM HG: CPT

## 2025-01-05 PROCEDURE — 71046 X-RAY EXAM CHEST 2 VIEWS: CPT | Mod: TC

## 2025-01-05 PROCEDURE — 3078F DIAST BP <80 MM HG: CPT

## 2025-01-05 PROCEDURE — 0241U POCT CEPHEID COV-2, FLU A/B, RSV - PCR: CPT

## 2025-01-05 PROCEDURE — 99214 OFFICE O/P EST MOD 30 MIN: CPT

## 2025-01-05 PROCEDURE — 87651 STREP A DNA AMP PROBE: CPT

## 2025-01-05 RX ORDER — IBUPROFEN 200 MG
600 TABLET ORAL ONCE
Status: COMPLETED | OUTPATIENT
Start: 2025-01-05 | End: 2025-01-05

## 2025-01-05 RX ORDER — FLUTICASONE PROPIONATE 50 MCG
1 SPRAY, SUSPENSION (ML) NASAL DAILY
Qty: 16 G | Refills: 0 | Status: SHIPPED | OUTPATIENT
Start: 2025-01-05

## 2025-01-05 RX ORDER — AMOXICILLIN 500 MG/1
500 CAPSULE ORAL 2 TIMES DAILY
Qty: 20 CAPSULE | Refills: 0 | Status: SHIPPED | OUTPATIENT
Start: 2025-01-05 | End: 2025-01-15

## 2025-01-05 RX ADMIN — Medication 600 MG: at 13:44

## 2025-01-05 ASSESSMENT — ENCOUNTER SYMPTOMS
SORE THROAT: 1
COUGH: 1
FEVER: 1

## 2025-01-05 ASSESSMENT — FIBROSIS 4 INDEX: FIB4 SCORE: 0.86

## 2025-01-05 NOTE — PROGRESS NOTES
CHIEF COMPLAINT  Chief Complaint   Patient presents with    Sinus Problem     Body ache , chills, fever     Subjective:   Monalisa Mcknight is a 39 y.o. female who presents to urgent care with concerns for sinus pressure and congestion.  Patient also reports associated symptoms of bodyaches, chills and fever.  She does report being seen in urgent care approximately 4 days ago with symptoms of cough.  During that visit she was tested with a viral swab, that was ultimately negative.  She reports taking Promethazine DM and benzonatate as needed for cough.  Denies any symptoms of shortness of breath.  No other pertinent past medical history.  For Sinus Problem (Body ache , chills, fever)      Review of Systems   Constitutional:  Positive for fever and malaise/fatigue.   HENT:  Positive for congestion and sore throat.    Respiratory:  Positive for cough.        PAST MEDICAL HISTORY  Patient Active Problem List    Diagnosis Date Noted    Weight gain, abnormal 07/09/2024    Exertional tachycardia 10/11/2023    Mitral regurgitation 10/11/2023    Syncope and collapse 10/11/2023       SURGICAL HISTORY   has a past surgical history that includes other abdominal surgery; other (2010); and eye surgery.    ALLERGIES  No Known Allergies    CURRENT MEDICATIONS  Home Medications       Reviewed by Berry Osorio Ass't (Medical Assistant) on 01/05/25 at 1228  Med List Status: <None>     Medication Last Dose Status   benzonatate (TESSALON) 100 MG Cap Taking Active   promethazine-dextromethorphan (PROMETHAZINE-DM) 6.25-15 MG/5ML syrup Taking Active   promethazine-dextromethorphan (PROMETHAZINE-DM) 6.25-15 MG/5ML syrup  Active                    SOCIAL HISTORY  Social History     Tobacco Use    Smoking status: Never    Smokeless tobacco: Never   Vaping Use    Vaping status: Never Used   Substance and Sexual Activity    Alcohol use: No    Drug use: No    Sexual activity: Yes     Partners: Male       FAMILY HISTORY  Family History  "  Problem Relation Age of Onset    Alcohol abuse Father     Heart Disease Father         CABG    Lung Disease Maternal Grandfather         emphysema, heavy smoker    Stroke Paternal Grandmother         multiple TIA, heavy smoker         Medications, Allergies, and current problem list reviewed today in Epic.     Objective:     /74   Pulse (!) 110   Temp (!) 38.5 °C (101.3 °F) (Temporal)   Resp 18   Ht 1.626 m (5' 4\")   Wt 60.3 kg (133 lb)   SpO2 94%     Physical Exam  Vitals reviewed.   Constitutional:       General: She is not in acute distress.     Appearance: Normal appearance. She is not ill-appearing or toxic-appearing.   HENT:      Head: Normocephalic.      Nose: Congestion and rhinorrhea present.      Mouth/Throat:      Mouth: Mucous membranes are moist.      Pharynx: Oropharynx is clear. Uvula midline. Posterior oropharyngeal erythema present.      Tonsils: 0 on the right. 0 on the left.   Cardiovascular:      Rate and Rhythm: Normal rate and regular rhythm.      Pulses: Normal pulses.      Heart sounds: Normal heart sounds.   Pulmonary:      Effort: Pulmonary effort is normal. No respiratory distress.      Breath sounds: Normal breath sounds. No stridor. No wheezing, rhonchi or rales.   Musculoskeletal:      Cervical back: Normal range of motion and neck supple. No tenderness.   Skin:     General: Skin is warm.      Capillary Refill: Capillary refill takes less than 2 seconds.   Neurological:      General: No focal deficit present.      Mental Status: She is alert.   Psychiatric:         Mood and Affect: Mood normal.         RADIOLOGY RESULTS   DX-CHEST-2 VIEWS    Result Date: 1/5/2025 1/5/2025 12:37 PM HISTORY/REASON FOR EXAM:  Cough. TECHNIQUE/EXAM DESCRIPTION AND NUMBER OF VIEWS: Two views of the chest. COMPARISON:  None. FINDINGS: The heart is normal in size. No pulmonary infiltrates or consolidations are noted. No pleural effusions are appreciated.     No active disease.        "   Assessment/Plan:     Diagnosis and associated orders:     1. Acute cough  DX-CHEST-2 VIEWS      2. Pharyngitis, unspecified etiology  POCT CoV-2, Flu A/B, RSV by PCR    POCT GROUP A STREP, PCR    ibuprofen (Motrin) tablet 600 mg      3. Fever, unspecified fever cause  ibuprofen (Motrin) tablet 600 mg      4. Nasal congestion  fluticasone (FLONASE) 50 MCG/ACT nasal spray      5. Strep pharyngitis  amoxicillin (AMOXIL) 500 MG Cap      6. Influenza A           Comments/MDM:     The patient presents with symptoms suspicious for likely viral upper respiratory infection. Differential includes bacterial pneumonia, sinusitis, allergic rhinitis. Do not suspect underlying cardiopulmonary process. I considered, but think unlikely, dangerous causes of this patient’s symptoms to include CHF or COPD exacerbations, pneumonia, pneumothorax. Patient is nontoxic appearing and not in need of emergent medical intervention. They have a normal pulse oximetry on room air, afebrile, and a normal pulmonary exam. Overall, the patient is very well appearing.  Chest x-ray reveals no acute cardiopulmonary abnormality.  I do not feel that this patient would benefit from antibiotics at this time.   Discussed chest x-ray findings with patient today in clinic.  Will repeat viral testing as initial viral test was completed when symptoms were only present for 0.5 days.  Recommended symptomatic and supportive care at this time that includes plenty of fluids, rest, Tylenol/Ibuprofen for pain/fever, warm salt water gargles for sore throat, OTC cough and decongestant medication, Flonase, nasal saline washes.    POC strep positive and influenza a positive.  Patient called to discuss results.  Amoxicillin sent to preferred pharmacy for treatment of acute strep pharyngitis.  Counseled to change toothbrush 3 days into course of antibiotics and sterilize all water bottles.  Advised to continue supportive care with Tylenol Motrin rest and hydration.  Return  to clinic if symptoms worsen or fail to resolve.       Differential diagnosis, natural history, supportive care, and indications for immediate follow-up discussed.    Advised the patient to follow-up with the primary care physician for recheck, reevaluation, and consideration of further management.    Please note that this dictation was created using voice recognition software. I have made a reasonable attempt to correct obvious errors, but I expect that there are errors of grammar and possibly content that I did not discover before finalizing the note.    This note was electronically signed by JEREMIAS Monae

## 2025-01-08 ENCOUNTER — OFFICE VISIT (OUTPATIENT)
Dept: URGENT CARE | Facility: CLINIC | Age: 40
End: 2025-01-08
Payer: COMMERCIAL

## 2025-01-08 VITALS
TEMPERATURE: 97.3 F | WEIGHT: 133 LBS | DIASTOLIC BLOOD PRESSURE: 60 MMHG | HEIGHT: 64 IN | BODY MASS INDEX: 22.71 KG/M2 | RESPIRATION RATE: 16 BRPM | HEART RATE: 98 BPM | OXYGEN SATURATION: 100 % | SYSTOLIC BLOOD PRESSURE: 108 MMHG

## 2025-01-08 DIAGNOSIS — J02.0 STREP PHARYNGITIS: ICD-10-CM

## 2025-01-08 DIAGNOSIS — J10.1 INFLUENZA A: ICD-10-CM

## 2025-01-08 PROCEDURE — 3074F SYST BP LT 130 MM HG: CPT | Performed by: NURSE PRACTITIONER

## 2025-01-08 PROCEDURE — 3078F DIAST BP <80 MM HG: CPT | Performed by: NURSE PRACTITIONER

## 2025-01-08 PROCEDURE — 99213 OFFICE O/P EST LOW 20 MIN: CPT | Performed by: NURSE PRACTITIONER

## 2025-01-08 RX ORDER — DEXAMETHASONE SODIUM PHOSPHATE 10 MG/ML
10 INJECTION INTRAMUSCULAR; INTRAVENOUS ONCE
Status: COMPLETED | OUTPATIENT
Start: 2025-01-08 | End: 2025-01-08

## 2025-01-08 RX ADMIN — DEXAMETHASONE SODIUM PHOSPHATE 10 MG: 10 INJECTION INTRAMUSCULAR; INTRAVENOUS at 11:14

## 2025-01-08 ASSESSMENT — ENCOUNTER SYMPTOMS
NAUSEA: 0
COUGH: 1
VOMITING: 0
DIARRHEA: 0
SORE THROAT: 1
WHEEZING: 1
HEMOPTYSIS: 1
FEVER: 1

## 2025-01-08 ASSESSMENT — FIBROSIS 4 INDEX: FIB4 SCORE: 0.86

## 2025-01-08 NOTE — PATIENT INSTRUCTIONS
Symptomatic care.  -Oral hydration and rest.   -Over the counter cough suppressant as directed.  -Diphenhydramine as directed for rhinorrhea (runny nose) and sneezing.  -May take over the counter pseudoephedrine for nasal congestion.   -Tylenol or ibuprofen for pain and fever as directed. In children, Avoid Aspirin.   -Saline nasal spray as a decongestant.  -Warm salt water gargles  -Cepacol throat spray.  -Infection control measures at home. Hand washing, covering sneeze/cough.  -Remain home from work, school, and other populated environments until at least 24 hours after you no longer have a fever.     Follow up with primary care provider. Follow up urgently for worsening symptoms, ear pain or drainage, shortness of breath, abdominal pain, or any other concerns. Follow up emergently for trouble breathing, elevated heart rate, chest pain, signs of dehydration, dizziness, weakness, decreased urine output, confusion, persistent vomiting, severe headache, neck stiffness, persistent high grade fever.

## 2025-01-08 NOTE — PROGRESS NOTES
Subjective:     Monalisa Mcknight is a 39 y.o. female who presents for Other (Pt tested positive for flu a and strep, pt is not feeling like the meds are working)      States she's not feeling better with the antibiotics. The cough is waking her up at night. Feels like razor blades for swallowing. Had felt short of breath with coughing fit.         Cough  The current episode started in the past 7 days. Associated symptoms include ear pain, a fever, hemoptysis, a sore throat and wheezing. Pertinent negatives include no rash. Treatments tried: Cough syrup. Motrin.   Pharyngitis   Associated symptoms include coughing and ear pain. Pertinent negatives include no diarrhea or vomiting.       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    EYE SURGERY      implantable contacts    OTHER ABDOMINAL SURGERY      hernia repair       Social History     Socioeconomic History    Marital status:      Spouse name: Not on file    Number of children: Not on file    Years of education: Not on file    Highest education level: Not on file   Occupational History    Not on file   Tobacco Use    Smoking status: Never    Smokeless tobacco: Never   Vaping Use    Vaping status: Never Used   Substance and Sexual Activity    Alcohol use: No    Drug use: No    Sexual activity: Yes     Partners: Male   Other Topics Concern    Not on file   Social History Narrative    Not on file     Social Drivers of Health     Financial Resource Strain: Not on file   Food Insecurity: Not on file   Transportation Needs: Not on file   Physical Activity: Not on file   Stress: Not on file   Social Connections: Not on file   Intimate Partner Violence: Low Risk  (6/22/2024)    [facility]    History of Abuse     Have you ever been afraid of, threatened, neglected, or abused by someone?: No  "  Housing Stability: Not on file        Family History   Problem Relation Age of Onset    Alcohol abuse Father     Heart Disease Father         CABG    Lung Disease Maternal Grandfather         emphysema, heavy smoker    Stroke Paternal Grandmother         multiple TIA, heavy smoker        No Known Allergies    Review of Systems   Constitutional:  Positive for fever and malaise/fatigue.   HENT:  Positive for ear pain and sore throat.    Respiratory:  Positive for cough, hemoptysis and wheezing.    Gastrointestinal:  Negative for diarrhea, nausea and vomiting.   Skin:  Negative for rash.   All other systems reviewed and are negative.       Objective:   /60   Pulse 98   Temp 36.3 °C (97.3 °F) (Temporal)   Resp 16   Ht 1.626 m (5' 4\")   Wt 60.3 kg (133 lb)   SpO2 100%   BMI 22.83 kg/m²     Physical Exam  Vitals reviewed.   Constitutional:       General: She is not in acute distress.     Appearance: She is well-developed. She is ill-appearing. She is not toxic-appearing.   HENT:      Head: Normocephalic and atraumatic.      Right Ear: Ear canal and external ear normal. Tympanic membrane is not erythematous.      Left Ear: Ear canal and external ear normal. Tympanic membrane is not erythematous.      Nose: Congestion present.      Mouth/Throat:      Lips: Pink.      Mouth: Mucous membranes are moist.      Pharynx: Posterior oropharyngeal erythema present. No oropharyngeal exudate.      Tonsils: No tonsillar abscesses.      Comments: Clear speech.  Eyes:      Conjunctiva/sclera: Conjunctivae normal.   Cardiovascular:      Rate and Rhythm: Normal rate.   Pulmonary:      Effort: Pulmonary effort is normal. No respiratory distress.      Breath sounds: Normal breath sounds. No stridor. No wheezing, rhonchi or rales.      Comments: Cough noted.   Musculoskeletal:      Cervical back: Neck supple.   Skin:     General: Skin is warm and dry.      Findings: No rash.   Neurological:      Mental Status: She is alert and " oriented to person, place, and time.      GCS: GCS eye subscore is 4. GCS verbal subscore is 5. GCS motor subscore is 6.   Psychiatric:         Speech: Speech normal.       Assessment/Plan:   1. Influenza A    2. Strep pharyngitis  - dexamethasone (Decadron) injection (check route below) 10 mg    Symptomatic care.  -Oral hydration and rest.   -Over the counter cough suppressant as directed.  -Diphenhydramine as directed for rhinorrhea (runny nose) and sneezing.  -May take over the counter pseudoephedrine for nasal congestion.   -Tylenol or ibuprofen for pain and fever as directed. In children, Avoid Aspirin.   -Saline nasal spray as a decongestant.  -Warm salt water gargles  -Cepacol throat spray.  -Infection control measures at home. Hand washing, covering sneeze/cough.  -Remain home from work, school, and other populated environments until at least 24 hours after you no longer have a fever.     Follow up with primary care provider. Follow up urgently for worsening symptoms, ear pain or drainage, shortness of breath, abdominal pain, or any other concerns. Follow up emergently for trouble breathing, elevated heart rate, chest pain, signs of dehydration, dizziness, weakness, decreased urine output, confusion, persistent vomiting, severe headache, neck stiffness, persistent high grade fever.    -Discussed viral etiology of Influenza; and associated complications, including risk of pneumonia. No pneumonia noted. AOM has resolved. Stable vitals. Also discussed symptomatic care, and expected duration of viral illnesses (influenza).     Differential diagnosis, natural history, supportive care, and indications for immediate follow-up discussed.